# Patient Record
Sex: FEMALE | Race: WHITE | Employment: OTHER | URBAN - METROPOLITAN AREA
[De-identification: names, ages, dates, MRNs, and addresses within clinical notes are randomized per-mention and may not be internally consistent; named-entity substitution may affect disease eponyms.]

---

## 2017-01-23 ENCOUNTER — HOSPITAL ENCOUNTER (OUTPATIENT)
Dept: MAMMOGRAPHY | Age: 67
Discharge: HOME | End: 2017-01-23
Attending: SURGERY
Payer: COMMERCIAL

## 2017-01-23 ENCOUNTER — OFFICE VISIT (OUTPATIENT)
Dept: SURGERY | Age: 67
End: 2017-01-23

## 2017-01-23 VITALS
RESPIRATION RATE: 16 BRPM | OXYGEN SATURATION: 98 % | DIASTOLIC BLOOD PRESSURE: 80 MMHG | TEMPERATURE: 98.3 F | BODY MASS INDEX: 33.8 KG/M2 | SYSTOLIC BLOOD PRESSURE: 140 MMHG | HEART RATE: 87 BPM | WEIGHT: 198 LBS | HEIGHT: 64 IN

## 2017-01-23 DIAGNOSIS — N60.02 BILATERAL BREAST CYSTS: ICD-10-CM

## 2017-01-23 DIAGNOSIS — N60.01 BILATERAL BREAST CYSTS: ICD-10-CM

## 2017-01-23 DIAGNOSIS — N60.02 BILATERAL BREAST CYSTS: Primary | ICD-10-CM

## 2017-01-23 DIAGNOSIS — N60.01 BILATERAL BREAST CYSTS: Primary | ICD-10-CM

## 2017-01-23 PROCEDURE — 77066 DX MAMMO INCL CAD BI: CPT

## 2017-01-23 PROCEDURE — 76642 ULTRASOUND BREAST LIMITED: CPT

## 2017-01-23 RX ORDER — SPIRONOLACTONE 25 MG/1
TABLET ORAL
Refills: 2 | COMMUNITY
Start: 2017-01-14 | End: 2021-05-05

## 2017-01-23 RX ORDER — DICLOFENAC SODIUM 75 MG/1
TABLET, DELAYED RELEASE ORAL
Refills: 1 | Status: ON HOLD | COMMUNITY
Start: 2017-01-14 | End: 2017-06-06

## 2017-01-23 NOTE — PROGRESS NOTES
Surgery History and Physical    Subjective:      Paulie Sue is a 61 y.o.  female who presents for her yearly follow up evaluation regarding her history of cystic disease. She had her MMG and US completed on 1/23/17, which revealed benign findings and a cyst in the left breast that measures 2.2 x 1.4 x 2.3 cm. She is concerned about the cyst and was wondering if surgical intervention is necessary. Chief Complaint   Patient presents with    Breast Cyst     left--had mammogram and U/S today       Patient Active Problem List    Diagnosis Date Noted    Fibrocystic breast 10/29/2014    Hypertension     Lump or mass in breast 10/28/1988     Past Medical History   Diagnosis Date    Fibrocystic breast 10/29/2014    Hypertension     Lump or mass in breast 1988     bilateral breast cysts--seen by NH & AM & GP      Past Surgical History   Procedure Laterality Date    Pr breast surgery procedure unlisted  4/20/00     Angie Speaker: excision rt br cystic material    Pr breast surgery procedure unlisted  2/22/07     Angie Speaker: rt br biopsy w/ wire loc    Hx orthopaedic  1997     neck - C 5,6 fused    Hx gyn  1999     hysterectomy    Hx breast biopsy Right      2000 & 2007, benign    Chantal us bx breast rt vac asst Right 2005     benign      Social History   Substance Use Topics    Smoking status: Never Smoker    Smokeless tobacco: Not on file    Alcohol use Yes      Comment: occasional wine      History reviewed. No pertinent family history. Prior to Admission medications    Medication Sig Start Date End Date Taking? Authorizing Provider   diclofenac EC (VOLTAREN) 75 mg EC tablet TK 1 T PO BID PRF PAIN 1/14/17  Yes Historical Provider   spironolactone (ALDACTONE) 25 mg tablet TK 1 T PO QAM 1/14/17  Yes Historical Provider   estradiol (ESTRACE) 0.5 mg tablet Take  by mouth daily.    Yes Historical Provider   losartan-hydrochlorothiazide [facility]) 100-12.5 mg per tablet  10/3/11  Yes Historical Provider FLUTICASONE PROPIONATE (FLOVENT IN) Take  by inhalation. Yes Historical Provider   albuterol, refill, 90 mcg/Actuation Aero Take  by inhalation. Yes Historical Provider   fluticasone (FLONASE) 50 mcg/Actuation nasal spray by Nasal route nightly. Administer to  nostril. Yes Historical Provider   KLOR-CON 8 mEq tablet  9/28/10   Historical Provider   SINGULAIR 10 mg tablet  10/4/10   Historical Provider   benzonatate (TESSALON) 100 mg capsule  10/7/10   Historical Provider   conjugated estrogens (PREMARIN) 0.3 mg tablet Take  by mouth. Historical Provider     No Known Allergies      Review of Systems:    A comprehensive review of systems was negative except for that written in the History of Present Illness. Objective:     Visit Vitals    /80 (BP 1 Location: Left arm, BP Patient Position: Sitting)    Pulse 87    Temp 98.3 °F (36.8 °C) (Oral)    Resp 16    Ht 5' 4\" (1.626 m)    Wt 198 lb (89.8 kg)    SpO2 98%    BMI 33.99 kg/m2       Physical Exam:  General appearance: alert, cooperative, no distress, appears stated age  Head: Normocephalic, without obvious abnormality, atraumatic  Breasts: both breast normal to inspection, right breast normal to palpation, in the left breasts there is two cyst: one at 2 o clock position that is 3 cm and one at 3 o clock position laterally that's approximately 1 cm in size, no adenopathy bilaterally  Neurologic: Grossly normal      Assessment:       ICD-10-CM ICD-9-CM    1. Bilateral breast cysts N60.01 610.0     N60.02         Plan:     Follow up in 1 year with MMG. Written by mahendra Middleton, as dictated by Isamar Crowe MD.    Total face to face time with patient: 11 minutes. Greater than 50% of the time was spent in counseling. Signed By: Isamar Crowe MD    January 23, 2017

## 2017-01-23 NOTE — PROGRESS NOTES
1. Have you been to the ER, urgent care clinic since your last visit? Hospitalized since your last visit? No    2. Have you seen or consulted any other health care providers outside of the [address] [place] [address] since your last visit? Include any pap smears or colon screening.  No     Patient had mammogram and U/S of left breast  For cyst today

## 2017-01-23 NOTE — MR AVS SNAPSHOT
Visit Information Date & Time Provider Department Dept. Phone Encounter #  
 1/23/2017  1:20 PM Jacob Vela, [address] [facility] [address] [phone] [id number] Upcoming Health Maintenance Date Due Hepatitis C Screening 1950 DTaP/Tdap/Td series (1 - Tdap) 12/22/1973 PAP AKA CERVICAL CYTOLOGY 12/22/1973 FOBT Q 1 YEAR AGE 50-75 12/22/2002 ZOSTER VACCINE AGE 60> 12/22/2012 INFLUENZA AGE 9 TO ADULT 8/1/2016 BREAST CANCER SCRN MAMMOGRAM 11/2/2017 Allergies as of 1/23/2017  Review Complete On: 1/23/2017 By: Jacob Vela MD  
 No Known Allergies Current Immunizations  Never Reviewed No immunizations on file. Not reviewed this visit You Were Diagnosed With   
  
 Codes Comments Bilateral breast cysts    -  Primary ICD-10-CM: N60.01, N60.02 
ICD-9-CM: 610.0 Vitals BP Pulse Temp Resp Height(growth percentile) Weight(growth percentile) 140/80 (BP 1 Location: Left arm, BP Patient Position: Sitting) 87 98.3 °F (36.8 °C) (Oral) 16 5' 4\" (1.626 m) 198 lb (89.8 kg) SpO2 BMI Smoking Status 98% 33.99 kg/m2 Never Smoker BMI and BSA Data Body Mass Index Body Surface Area  
 33.99 kg/m 2 2.01 m 2 Preferred Pharmacy Pharmacy Name Phone [facility] [address] [place] [address] [phone] Your Updated Medication List  
  
   
This list is accurate as of: 1/23/17  2:01 PM.  Always use your most recent med list.  
  
  
  
  
 albuterol (refill) 90 mcg/actuation Aero Take  by inhalation. benzonatate 100 mg capsule Commonly known as:  TESSALON  
  
 diclofenac EC 75 mg EC tablet Commonly known as:  VOLTAREN  
TK 1 T PO BID PRF PAIN  
  
 estradiol 0.5 mg tablet Commonly known as:  ESTRACE Take  by mouth daily. * FLOVENT IN Take  by inhalation. * FLONASE 50 mcg/actuation nasal spray Generic drug:  fluticasone by Nasal route nightly. Administer to {[facility] RX NASAL each/right/left nostril:91654} nostril. KLOR-CON 8 8 mEq tablet Generic drug:  potassium chloride SR  
  
 losartan-hydroCHLOROthiazide 100-12.5 mg per tablet Commonly known as:  HYZAAR  
  
 PREMARIN 0.3 mg tablet Generic drug:  conjugated estrogens Take  by mouth. SINGULAIR 10 mg tablet Generic drug:  montelukast  
  
 spironolactone 25 mg tablet Commonly known as:  ALDACTONE  
TK 1 T PO QAM  
  
 * Notice: This list has 2 medication(s) that are the same as other medications prescribed for you. Read the directions carefully, and ask your doctor or other care provider to review them with you. Introducing [facility] HE[facility]! Dear Sonia Delarosa: 
Thank you for requesting a [employer] account. Our records indicate that you already have an active [employer] account. You can access your account anytime at [email or web address] Did you know that you can access your hospital and ER discharge instructions at any time in [employer]? You can also review all of your test results from your hospital stay or ER visit. Additional Information If you have questions, please visit the Frequently Asked Questions section of the [employer] website at [email or web address] Remember, [employer] is NOT to be used for urgent needs. For medical emergencies, dial 911. Now available from your iPhone and Android! Please provide this summary of care documentation to your next provider. Your primary care clinician is listed as Shelbie Rankin. If you have any questions after today's visit, please call [phone].

## 2017-05-26 ENCOUNTER — HOSPITAL ENCOUNTER (OUTPATIENT)
Dept: GENERAL RADIOLOGY | Age: 67
Discharge: HOME | End: 2017-05-26
Attending: ORTHOPAEDIC SURGERY
Payer: COMMERCIAL

## 2017-05-26 ENCOUNTER — HOSPITAL ENCOUNTER (OUTPATIENT)
Dept: PREADMISSION TESTING | Age: 67
Discharge: HOME | End: 2017-05-26
Payer: COMMERCIAL

## 2017-05-26 VITALS
RESPIRATION RATE: 17 BRPM | OXYGEN SATURATION: 96 % | BODY MASS INDEX: 34.97 KG/M2 | HEART RATE: 85 BPM | TEMPERATURE: 97.2 F | DIASTOLIC BLOOD PRESSURE: 77 MMHG | WEIGHT: 204.81 LBS | HEIGHT: 64 IN | SYSTOLIC BLOOD PRESSURE: 146 MMHG

## 2017-05-26 LAB
25(OH)D3 SERPL-MCNC: 30.9 NG/ML (ref 30–100)
ABO + RH BLD: NORMAL
ALBUMIN SERPL BCP-MCNC: 3.6 G/DL (ref 3.5–5)
ALBUMIN/GLOB SERPL: 1.1 {RATIO} (ref 1.1–2.2)
ALP SERPL-CCNC: 68 U/L (ref 45–117)
ALT SERPL-CCNC: 20 U/L (ref 12–78)
ANION GAP BLD CALC-SCNC: 9 MMOL/L (ref 5–15)
APPEARANCE UR: CLEAR
APTT PPP: 27.9 SEC (ref 22.1–32.5)
AST SERPL W P-5'-P-CCNC: 13 U/L (ref 15–37)
ATRIAL RATE: 75 BPM
BACTERIA URNS QL MICRO: NEGATIVE /HPF
BASOPHILS # BLD AUTO: 0 K/UL (ref 0–0.1)
BASOPHILS # BLD: 1 % (ref 0–1)
BILIRUB SERPL-MCNC: 0.4 MG/DL (ref 0.2–1)
BILIRUB UR QL: NEGATIVE
BLOOD GROUP ANTIBODIES SERPL: NORMAL
BUN SERPL-MCNC: 26 MG/DL (ref 6–20)
BUN/CREAT SERPL: 25 (ref 12–20)
CALCIUM SERPL-MCNC: 8.8 MG/DL (ref 8.5–10.1)
CALCULATED P AXIS, ECG09: 43 DEGREES
CALCULATED R AXIS, ECG10: 0 DEGREES
CALCULATED T AXIS, ECG11: 6 DEGREES
CHLORIDE SERPL-SCNC: 105 MMOL/L (ref 97–108)
CO2 SERPL-SCNC: 27 MMOL/L (ref 21–32)
COLOR UR: ABNORMAL
CREAT SERPL-MCNC: 1.03 MG/DL (ref 0.55–1.02)
DIAGNOSIS, 93000: NORMAL
EOSINOPHIL # BLD: 0.1 K/UL (ref 0–0.4)
EOSINOPHIL NFR BLD: 1 % (ref 0–7)
EPITH CASTS URNS QL MICRO: ABNORMAL /LPF
ERYTHROCYTE [DISTWIDTH] IN BLOOD BY AUTOMATED COUNT: 13 % (ref 11.5–14.5)
EST. AVERAGE GLUCOSE BLD GHB EST-MCNC: 111 MG/DL
GLOBULIN SER CALC-MCNC: 3.4 G/DL (ref 2–4)
GLUCOSE SERPL-MCNC: 79 MG/DL (ref 65–100)
GLUCOSE UR STRIP.AUTO-MCNC: NEGATIVE MG/DL
HBA1C MFR BLD: 5.5 % (ref 4.2–6.3)
HCT VFR BLD AUTO: 40.3 % (ref 35–47)
HGB BLD-MCNC: 13.2 G/DL (ref 11.5–16)
HGB UR QL STRIP: ABNORMAL
HYALINE CASTS URNS QL MICRO: ABNORMAL /LPF (ref 0–5)
INR PPP: 1 (ref 0.9–1.1)
KETONES UR QL STRIP.AUTO: NEGATIVE MG/DL
LEUKOCYTE ESTERASE UR QL STRIP.AUTO: NEGATIVE
LYMPHOCYTES # BLD AUTO: 28 % (ref 12–49)
LYMPHOCYTES # BLD: 2 K/UL (ref 0.8–3.5)
MCH RBC QN AUTO: 28.9 PG (ref 26–34)
MCHC RBC AUTO-ENTMCNC: 32.8 G/DL (ref 30–36.5)
MCV RBC AUTO: 88.4 FL (ref 80–99)
MONOCYTES # BLD: 0.8 K/UL (ref 0–1)
MONOCYTES NFR BLD AUTO: 11 % (ref 5–13)
NEUTS SEG # BLD: 4.1 K/UL (ref 1.8–8)
NEUTS SEG NFR BLD AUTO: 59 % (ref 32–75)
NITRITE UR QL STRIP.AUTO: NEGATIVE
P-R INTERVAL, ECG05: 150 MS
PH UR STRIP: 7 [PH] (ref 5–8)
PLATELET # BLD AUTO: 334 K/UL (ref 150–400)
POTASSIUM SERPL-SCNC: 4.4 MMOL/L (ref 3.5–5.1)
PROT SERPL-MCNC: 7 G/DL (ref 6.4–8.2)
PROT UR STRIP-MCNC: NEGATIVE MG/DL
PROTHROMBIN TIME: 9.9 SEC (ref 9–11.1)
Q-T INTERVAL, ECG07: 382 MS
QRS DURATION, ECG06: 80 MS
QTC CALCULATION (BEZET), ECG08: 426 MS
RBC # BLD AUTO: 4.56 M/UL (ref 3.8–5.2)
RBC #/AREA URNS HPF: ABNORMAL /HPF (ref 0–5)
SODIUM SERPL-SCNC: 141 MMOL/L (ref 136–145)
SP GR UR REFRACTOMETRY: 1.02 (ref 1–1.03)
SPECIMEN EXP DATE BLD: NORMAL
THERAPEUTIC RANGE,PTTT: NORMAL SECS (ref 58–77)
UA: UC IF INDICATED,UAUC: ABNORMAL
UROBILINOGEN UR QL STRIP.AUTO: 0.2 EU/DL (ref 0.2–1)
VENTRICULAR RATE, ECG03: 75 BPM
WBC # BLD AUTO: 6.9 K/UL (ref 3.6–11)
WBC URNS QL MICRO: ABNORMAL /HPF (ref 0–4)

## 2017-05-26 PROCEDURE — 85610 PROTHROMBIN TIME: CPT | Performed by: ORTHOPAEDIC SURGERY

## 2017-05-26 PROCEDURE — 82306 VITAMIN D 25 HYDROXY: CPT | Performed by: ORTHOPAEDIC SURGERY

## 2017-05-26 PROCEDURE — 85730 THROMBOPLASTIN TIME PARTIAL: CPT | Performed by: ORTHOPAEDIC SURGERY

## 2017-05-26 PROCEDURE — 86900 BLOOD TYPING SEROLOGIC ABO: CPT | Performed by: ORTHOPAEDIC SURGERY

## 2017-05-26 PROCEDURE — 85025 COMPLETE CBC W/AUTO DIFF WBC: CPT | Performed by: ORTHOPAEDIC SURGERY

## 2017-05-26 PROCEDURE — 80053 COMPREHEN METABOLIC PANEL: CPT | Performed by: ORTHOPAEDIC SURGERY

## 2017-05-26 PROCEDURE — 83036 HEMOGLOBIN GLYCOSYLATED A1C: CPT | Performed by: ORTHOPAEDIC SURGERY

## 2017-05-26 PROCEDURE — 71020 XR CHEST PA LAT: CPT

## 2017-05-26 PROCEDURE — 36415 COLL VENOUS BLD VENIPUNCTURE: CPT | Performed by: ORTHOPAEDIC SURGERY

## 2017-05-26 PROCEDURE — 81001 URINALYSIS AUTO W/SCOPE: CPT | Performed by: ORTHOPAEDIC SURGERY

## 2017-05-26 PROCEDURE — 93005 ELECTROCARDIOGRAM TRACING: CPT

## 2017-05-26 RX ORDER — GLUCOSAMINE SULFATE 1500 MG
1000 POWDER IN PACKET (EA) ORAL EVERY MORNING
COMMUNITY

## 2017-05-26 RX ORDER — ALBUTEROL SULFATE 90 UG/1
2 AEROSOL, METERED RESPIRATORY (INHALATION)
COMMUNITY
End: 2021-05-10

## 2017-05-26 RX ORDER — FLUTICASONE PROPIONATE 110 UG/1
2 AEROSOL, METERED RESPIRATORY (INHALATION)
COMMUNITY
End: 2021-05-05

## 2017-05-26 RX ORDER — CETIRIZINE HCL 10 MG
10 TABLET ORAL
COMMUNITY
End: 2021-05-05

## 2017-05-26 NOTE — PERIOP NOTES
[facility]  PREOPERATIVE INSTRUCTIONS    Surgery Date:   Tuesday, 6/6/17. Surgery arrival time given by surgeon: NO   If no,[address] CARL[facility] [address] staff will call you between 2 PM- 7 PM the day before surgery with your arrival time. If your surgery is on a Monday, we will call you the preceding Friday. Please call [phone] after 8 PM if you did not receive your arrival time. Verification phone call on Monday, 6/5/17. 1. Please report at the designated time to the [facility] [address]. Bring your insurance card, photo identification, and any copayment ( if applicable). 2. You must have a responsible adult to drive you home. You need to have a responsible adult to stay with you the first 24 hours after surgery if you are going home the same day of your surgery and you should not drive a car for 24 hours following your surgery. 3. Nothing to eat or drink after midnight the night before surgery. This includes no water, gum, mints, coffee, juice, etc.  Please note special instructions, if applicable, below for medications. 4. MEDICATIONS TO TAKE THE MORNING OF SURGERY WITH A SIP OF WATER: Spironolactone and use nasal spray and inhaler. 5. No alcoholic beverages 24 hours before or after your surgery. 6. If you are being admitted to the hospital,please leave personal belongings/luggage in your car until you have an assigned hospital room number. 7. Stop Aspirin and/or any non-steroidal anti-inflammatory drugs (i.e. Ibuprofen, Naproxen, Advil, Aleve) as directed by your surgeon. You may take Tylenol. Stop herbal supplements 1 week prior to  surgery. 8. If you are currently taking Plavix, Coumadin,or any other blood-thinning/anticoagulant medication contact your surgeon for instructions. 9. Please wear comfortable clothes. Wear your glasses instead of contacts. We ask that all money, jewelry and valuables be left at home. Wear no make up, particularly mascara, the day of surgery.    10.  All body piercings, rings,and jewelry need to be removed and left at home. Please wear your hair loose or down. Please no pony-tails, buns, or any metal hair accessories. If you shower the morning of surgery, please do not apply any lotions, powders, or deodorants afterwards. Do not shave any body area within 24 hours of your surgery. 11. Please follow all instructions to avoid any potential surgical cancellation. 12.  Should your physical condition change, (i.e. fever, cold, flu, etc.) please notify your surgeon as soon as possible. 13. It is important to be on time. If a situation occurs where you may be delayed, please call:  [phone] / [phone] on the day of surgery. 14. The [employer] staff can be reached at 21 [phone]. .  15. Special instructions: Free  parking. Bring completed medication form on day of surgery. The patient was contacted  in person. She  verbalize  understanding of all instructions does not  need reinforcement.

## 2017-05-27 LAB
BACTERIA SPEC CULT: ABNORMAL
BACTERIA SPEC CULT: ABNORMAL
SERVICE CMNT-IMP: ABNORMAL

## 2017-06-05 ENCOUNTER — ANESTHESIA EVENT (OUTPATIENT)
Dept: SURGERY | Age: 67
End: 2017-06-05
Payer: COMMERCIAL

## 2017-06-05 NOTE — PROGRESS NOTES
Voicemail left at [phone] [facility] [address] office requesting the preoperative MRSA treatment plan and preoperative antibiotic order for Vancomycin. Refaxed the MRSA result from 5/26/17 and preoperative MRSA treatment plan request to [address] [place] [address] S office. Rosana Costello phoned from [address] [place] [address] S office and stated that the patient was started on Bactroban nasal ointment 6/2/17 and will send the preoperative antibiotic order later today.

## 2017-06-05 NOTE — PERIOP NOTES
Have not received the MRSA plan from Dr. Serena Schaffer office as of yet. Left a VM for Sussy Naylor requesting the plan be faxed to main preop's fax number.   DOS: 6/6/2017

## 2017-06-06 ENCOUNTER — HOSPITAL ENCOUNTER (OUTPATIENT)
Age: 67
Setting detail: OBSERVATION
LOS: 1 days | Discharge: HOME | End: 2017-06-07
Attending: ORTHOPAEDIC SURGERY | Admitting: ORTHOPAEDIC SURGERY
Payer: COMMERCIAL

## 2017-06-06 ENCOUNTER — ANESTHESIA (OUTPATIENT)
Dept: SURGERY | Age: 67
End: 2017-06-06
Payer: COMMERCIAL

## 2017-06-06 ENCOUNTER — APPOINTMENT (OUTPATIENT)
Dept: GENERAL RADIOLOGY | Age: 67
End: 2017-06-06
Attending: ORTHOPAEDIC SURGERY
Payer: COMMERCIAL

## 2017-06-06 PROBLEM — M48.02 CERVICAL STENOSIS OF SPINE: Status: ACTIVE | Noted: 2017-06-06

## 2017-06-06 PROCEDURE — 74011000272 HC RX REV CODE- 272: Performed by: ORTHOPAEDIC SURGERY

## 2017-06-06 PROCEDURE — 77030029620 HC BIT DRL K2M -B: Performed by: ORTHOPAEDIC SURGERY

## 2017-06-06 PROCEDURE — 77030018846 HC SOL IRR STRL H20 ICUM -A: Performed by: ORTHOPAEDIC SURGERY

## 2017-06-06 PROCEDURE — 77030008467 HC STPLR SKN COVD -B: Performed by: ORTHOPAEDIC SURGERY

## 2017-06-06 PROCEDURE — 77030031139 HC SUT VCRL2 J&J -A: Performed by: ORTHOPAEDIC SURGERY

## 2017-06-06 PROCEDURE — 74011250637 HC RX REV CODE- 250/637: Performed by: PHYSICIAN ASSISTANT

## 2017-06-06 PROCEDURE — 74011250636 HC RX REV CODE- 250/636: Performed by: ANESTHESIOLOGY

## 2017-06-06 PROCEDURE — C1821 INTERSPINOUS IMPLANT: HCPCS | Performed by: ORTHOPAEDIC SURGERY

## 2017-06-06 PROCEDURE — 77030008771 HC TU NG SALEM SUMP -A: Performed by: ANESTHESIOLOGY

## 2017-06-06 PROCEDURE — 77030037302 HC SPCR CERV LORDTC INLC -G: Performed by: ORTHOPAEDIC SURGERY

## 2017-06-06 PROCEDURE — 77030029099 HC BN WAX SSPC -A: Performed by: ORTHOPAEDIC SURGERY

## 2017-06-06 PROCEDURE — 77030032490 HC SLV COMPR SCD KNE COVD -B

## 2017-06-06 PROCEDURE — 76210000016 HC OR PH I REC 1 TO 1.5 HR: Performed by: ORTHOPAEDIC SURGERY

## 2017-06-06 PROCEDURE — 74011250636 HC RX REV CODE- 250/636: Performed by: PHYSICIAN ASSISTANT

## 2017-06-06 PROCEDURE — 74011250636 HC RX REV CODE- 250/636: Performed by: ORTHOPAEDIC SURGERY

## 2017-06-06 PROCEDURE — 77030011640 HC PAD GRND REM COVD -A: Performed by: ORTHOPAEDIC SURGERY

## 2017-06-06 PROCEDURE — 77030013079 HC BLNKT BAIR HGGR 3M -A: Performed by: ANESTHESIOLOGY

## 2017-06-06 PROCEDURE — 77030026438 HC STYL ET INTUB CARD -A: Performed by: ANESTHESIOLOGY

## 2017-06-06 PROCEDURE — 77030034850: Performed by: ORTHOPAEDIC SURGERY

## 2017-06-06 PROCEDURE — 77030003907 HC BIT DRL VRTX MEDT -D: Performed by: ORTHOPAEDIC SURGERY

## 2017-06-06 PROCEDURE — 76060000039 HC ANESTHESIA 4 TO 4.5 HR: Performed by: ORTHOPAEDIC SURGERY

## 2017-06-06 PROCEDURE — 77030011267 HC ELECTRD BLD COVD -A: Performed by: ORTHOPAEDIC SURGERY

## 2017-06-06 PROCEDURE — 74011000250 HC RX REV CODE- 250

## 2017-06-06 PROCEDURE — 77030002933 HC SUT MCRYL J&J -A: Performed by: ORTHOPAEDIC SURGERY

## 2017-06-06 PROCEDURE — 74011250636 HC RX REV CODE- 250/636

## 2017-06-06 PROCEDURE — 76001 XR FLUOROSCOPY OVER 60 MINUTES: CPT

## 2017-06-06 PROCEDURE — 77030020782 HC GWN BAIR PAWS FLX 3M -B

## 2017-06-06 PROCEDURE — 77030018836 HC SOL IRR NACL ICUM -A: Performed by: ORTHOPAEDIC SURGERY

## 2017-06-06 PROCEDURE — C1713 ANCHOR/SCREW BN/BN,TIS/BN: HCPCS | Performed by: ORTHOPAEDIC SURGERY

## 2017-06-06 PROCEDURE — 77030034475 HC MISC IMPL SPN: Performed by: ORTHOPAEDIC SURGERY

## 2017-06-06 PROCEDURE — 77030012935 HC DRSG AQUACEL BMS -B: Performed by: ORTHOPAEDIC SURGERY

## 2017-06-06 PROCEDURE — 77030020061 HC IV BLD WRMR ADMIN SET 3M -B: Performed by: ANESTHESIOLOGY

## 2017-06-06 PROCEDURE — 74011000250 HC RX REV CODE- 250: Performed by: ORTHOPAEDIC SURGERY

## 2017-06-06 PROCEDURE — 77030019908 HC STETH ESOPH SIMS -A: Performed by: ANESTHESIOLOGY

## 2017-06-06 PROCEDURE — 76010000175 HC OR TIME 4 TO 4.5 HR INTENSV-TIER 1: Performed by: ORTHOPAEDIC SURGERY

## 2017-06-06 PROCEDURE — 99218 HC RM OBSERVATION: CPT

## 2017-06-06 PROCEDURE — 77030037731 HC PTTY BN HEMOSRB ABRY -H: Performed by: ORTHOPAEDIC SURGERY

## 2017-06-06 PROCEDURE — 77030003666 HC NDL SPINAL BD -A: Performed by: ORTHOPAEDIC SURGERY

## 2017-06-06 PROCEDURE — 77030003445 HC NDL BIOP BN BD -B: Performed by: ORTHOPAEDIC SURGERY

## 2017-06-06 PROCEDURE — 77030008684 HC TU ET CUF COVD -B: Performed by: ANESTHESIOLOGY

## 2017-06-06 PROCEDURE — 77030018673: Performed by: ORTHOPAEDIC SURGERY

## 2017-06-06 DEVICE — SCREW SPNL L16MM DIA4MM CERV SELF STARTING CONSTRN LO PROF: Type: IMPLANTABLE DEVICE | Site: SPINE CERVICAL | Status: FUNCTIONAL

## 2017-06-06 DEVICE — GRAFT BNE 15X13X7MM SPCR CERV ALLGRFT CAPISTRANO: Type: IMPLANTABLE DEVICE | Site: SPINE CERVICAL | Status: FUNCTIONAL

## 2017-06-06 RX ORDER — NALOXONE HYDROCHLORIDE 0.4 MG/ML
0.4 INJECTION, SOLUTION INTRAMUSCULAR; INTRAVENOUS; SUBCUTANEOUS AS NEEDED
Status: DISCONTINUED | OUTPATIENT
Start: 2017-06-06 | End: 2017-06-07 | Stop reason: HOSPADM

## 2017-06-06 RX ORDER — FENTANYL CITRATE 50 UG/ML
INJECTION, SOLUTION INTRAMUSCULAR; INTRAVENOUS AS NEEDED
Status: DISCONTINUED | OUTPATIENT
Start: 2017-06-06 | End: 2017-06-06 | Stop reason: HOSPADM

## 2017-06-06 RX ORDER — SODIUM CHLORIDE 0.9 % (FLUSH) 0.9 %
5-10 SYRINGE (ML) INJECTION AS NEEDED
Status: DISCONTINUED | OUTPATIENT
Start: 2017-06-06 | End: 2017-06-07 | Stop reason: HOSPADM

## 2017-06-06 RX ORDER — PROPOFOL 10 MG/ML
VIAL (ML) INTRAVENOUS
Status: DISCONTINUED | OUTPATIENT
Start: 2017-06-06 | End: 2017-06-06 | Stop reason: HOSPADM

## 2017-06-06 RX ORDER — SODIUM CHLORIDE 0.9 % (FLUSH) 0.9 %
5-10 SYRINGE (ML) INJECTION AS NEEDED
Status: DISCONTINUED | OUTPATIENT
Start: 2017-06-06 | End: 2017-06-06 | Stop reason: HOSPADM

## 2017-06-06 RX ORDER — SODIUM CHLORIDE 9 MG/ML
125 INJECTION, SOLUTION INTRAVENOUS CONTINUOUS
Status: DISCONTINUED | OUTPATIENT
Start: 2017-06-06 | End: 2017-06-07 | Stop reason: HOSPADM

## 2017-06-06 RX ORDER — MUPIROCIN 20 MG/G
OINTMENT TOPICAL 2 TIMES DAILY
Status: DISCONTINUED | OUTPATIENT
Start: 2017-06-06 | End: 2017-06-07 | Stop reason: HOSPADM

## 2017-06-06 RX ORDER — HYDROMORPHONE HYDROCHLORIDE 1 MG/ML
.5-1 INJECTION, SOLUTION INTRAMUSCULAR; INTRAVENOUS; SUBCUTANEOUS
Status: DISCONTINUED | OUTPATIENT
Start: 2017-06-06 | End: 2017-06-06 | Stop reason: HOSPADM

## 2017-06-06 RX ORDER — SODIUM CHLORIDE 0.9 % (FLUSH) 0.9 %
5-10 SYRINGE (ML) INJECTION EVERY 8 HOURS
Status: DISCONTINUED | OUTPATIENT
Start: 2017-06-06 | End: 2017-06-06 | Stop reason: HOSPADM

## 2017-06-06 RX ORDER — LIDOCAINE HYDROCHLORIDE 20 MG/ML
INJECTION, SOLUTION EPIDURAL; INFILTRATION; INTRACAUDAL; PERINEURAL AS NEEDED
Status: DISCONTINUED | OUTPATIENT
Start: 2017-06-06 | End: 2017-06-06 | Stop reason: HOSPADM

## 2017-06-06 RX ORDER — ALBUTEROL SULFATE 90 UG/1
2 AEROSOL, METERED RESPIRATORY (INHALATION)
Status: DISCONTINUED | OUTPATIENT
Start: 2017-06-06 | End: 2017-06-07 | Stop reason: HOSPADM

## 2017-06-06 RX ORDER — HYDROMORPHONE HYDROCHLORIDE 1 MG/ML
0.5 INJECTION, SOLUTION INTRAMUSCULAR; INTRAVENOUS; SUBCUTANEOUS
Status: DISCONTINUED | OUTPATIENT
Start: 2017-06-06 | End: 2017-06-07 | Stop reason: HOSPADM

## 2017-06-06 RX ORDER — SODIUM CHLORIDE 0.9 % (FLUSH) 0.9 %
5-10 SYRINGE (ML) INJECTION EVERY 8 HOURS
Status: DISCONTINUED | OUTPATIENT
Start: 2017-06-07 | End: 2017-06-07 | Stop reason: HOSPADM

## 2017-06-06 RX ORDER — FACIAL-BODY WIPES
10 EACH TOPICAL DAILY PRN
Status: DISCONTINUED | OUTPATIENT
Start: 2017-06-08 | End: 2017-06-07 | Stop reason: HOSPADM

## 2017-06-06 RX ORDER — OXYCODONE HYDROCHLORIDE 5 MG/1
5 TABLET ORAL
Status: DISCONTINUED | OUTPATIENT
Start: 2017-06-06 | End: 2017-06-07 | Stop reason: HOSPADM

## 2017-06-06 RX ORDER — CELECOXIB 400 MG/1
400 CAPSULE ORAL
Status: DISCONTINUED | OUTPATIENT
Start: 2017-06-06 | End: 2017-06-06 | Stop reason: HOSPADM

## 2017-06-06 RX ORDER — VANCOMYCIN/0.9 % SOD CHLORIDE 1.5G/250ML
1500 PLASTIC BAG, INJECTION (ML) INTRAVENOUS EVERY 12 HOURS
Status: COMPLETED | OUTPATIENT
Start: 2017-06-07 | End: 2017-06-07

## 2017-06-06 RX ORDER — VANCOMYCIN/0.9 % SOD CHLORIDE 1.5G/250ML
1500 PLASTIC BAG, INJECTION (ML) INTRAVENOUS EVERY 12 HOURS
Status: COMPLETED | OUTPATIENT
Start: 2017-06-06 | End: 2017-06-07

## 2017-06-06 RX ORDER — GLYCOPYRROLATE 0.2 MG/ML
INJECTION INTRAMUSCULAR; INTRAVENOUS AS NEEDED
Status: DISCONTINUED | OUTPATIENT
Start: 2017-06-06 | End: 2017-06-06 | Stop reason: HOSPADM

## 2017-06-06 RX ORDER — ONDANSETRON 2 MG/ML
4 INJECTION INTRAMUSCULAR; INTRAVENOUS
Status: DISCONTINUED | OUTPATIENT
Start: 2017-06-06 | End: 2017-06-07 | Stop reason: HOSPADM

## 2017-06-06 RX ORDER — FLUTICASONE PROPIONATE 50 MCG
2 SPRAY, SUSPENSION (ML) NASAL
Status: DISCONTINUED | OUTPATIENT
Start: 2017-06-07 | End: 2017-06-07 | Stop reason: HOSPADM

## 2017-06-06 RX ORDER — NEOSTIGMINE METHYLSULFATE 1 MG/ML
INJECTION INTRAVENOUS AS NEEDED
Status: DISCONTINUED | OUTPATIENT
Start: 2017-06-06 | End: 2017-06-06 | Stop reason: HOSPADM

## 2017-06-06 RX ORDER — CETIRIZINE HCL 10 MG
10 TABLET ORAL
Status: DISCONTINUED | OUTPATIENT
Start: 2017-06-06 | End: 2017-06-07 | Stop reason: HOSPADM

## 2017-06-06 RX ORDER — AMOXICILLIN 250 MG
1 CAPSULE ORAL 2 TIMES DAILY
Status: DISCONTINUED | OUTPATIENT
Start: 2017-06-06 | End: 2017-06-07 | Stop reason: HOSPADM

## 2017-06-06 RX ORDER — ROCURONIUM BROMIDE 10 MG/ML
INJECTION, SOLUTION INTRAVENOUS AS NEEDED
Status: DISCONTINUED | OUTPATIENT
Start: 2017-06-06 | End: 2017-06-06 | Stop reason: HOSPADM

## 2017-06-06 RX ORDER — SPIRONOLACTONE 25 MG/1
25 TABLET ORAL DAILY
Status: DISCONTINUED | OUTPATIENT
Start: 2017-06-07 | End: 2017-06-07 | Stop reason: HOSPADM

## 2017-06-06 RX ORDER — VANCOMYCIN/0.9 % SOD CHLORIDE 1.5G/250ML
1500 PLASTIC BAG, INJECTION (ML) INTRAVENOUS
Status: COMPLETED | OUTPATIENT
Start: 2017-06-06 | End: 2017-06-07

## 2017-06-06 RX ORDER — LIDOCAINE HYDROCHLORIDE 10 MG/ML
0.1 INJECTION, SOLUTION EPIDURAL; INFILTRATION; INTRACAUDAL; PERINEURAL AS NEEDED
Status: DISCONTINUED | OUTPATIENT
Start: 2017-06-06 | End: 2017-06-06 | Stop reason: HOSPADM

## 2017-06-06 RX ORDER — SODIUM CHLORIDE, SODIUM LACTATE, POTASSIUM CHLORIDE, CALCIUM CHLORIDE 600; 310; 30; 20 MG/100ML; MG/100ML; MG/100ML; MG/100ML
125 INJECTION, SOLUTION INTRAVENOUS CONTINUOUS
Status: DISCONTINUED | OUTPATIENT
Start: 2017-06-06 | End: 2017-06-06 | Stop reason: HOSPADM

## 2017-06-06 RX ORDER — CEFAZOLIN SODIUM IN 0.9 % NACL 2 G/50 ML
2 INTRAVENOUS SOLUTION, PIGGYBACK (ML) INTRAVENOUS
Status: DISCONTINUED | OUTPATIENT
Start: 2017-06-06 | End: 2017-06-06

## 2017-06-06 RX ORDER — PROPOFOL 10 MG/ML
INJECTION, EMULSION INTRAVENOUS AS NEEDED
Status: DISCONTINUED | OUTPATIENT
Start: 2017-06-06 | End: 2017-06-06 | Stop reason: HOSPADM

## 2017-06-06 RX ORDER — DIAZEPAM 5 MG/1
5 TABLET ORAL
Status: DISCONTINUED | OUTPATIENT
Start: 2017-06-06 | End: 2017-06-07 | Stop reason: HOSPADM

## 2017-06-06 RX ORDER — CYANOCOBALAMIN (VITAMIN B-12) 500 MCG
1000 TABLET ORAL DAILY
Status: DISCONTINUED | OUTPATIENT
Start: 2017-06-07 | End: 2017-06-07 | Stop reason: HOSPADM

## 2017-06-06 RX ORDER — ONDANSETRON 2 MG/ML
INJECTION INTRAMUSCULAR; INTRAVENOUS AS NEEDED
Status: DISCONTINUED | OUTPATIENT
Start: 2017-06-06 | End: 2017-06-06 | Stop reason: HOSPADM

## 2017-06-06 RX ORDER — ONDANSETRON 2 MG/ML
4 INJECTION INTRAMUSCULAR; INTRAVENOUS AS NEEDED
Status: DISCONTINUED | OUTPATIENT
Start: 2017-06-06 | End: 2017-06-06 | Stop reason: HOSPADM

## 2017-06-06 RX ORDER — SUCCINYLCHOLINE CHLORIDE 20 MG/ML
INJECTION INTRAMUSCULAR; INTRAVENOUS AS NEEDED
Status: DISCONTINUED | OUTPATIENT
Start: 2017-06-06 | End: 2017-06-06 | Stop reason: HOSPADM

## 2017-06-06 RX ORDER — MIDAZOLAM HYDROCHLORIDE 1 MG/ML
INJECTION, SOLUTION INTRAMUSCULAR; INTRAVENOUS AS NEEDED
Status: DISCONTINUED | OUTPATIENT
Start: 2017-06-06 | End: 2017-06-06 | Stop reason: HOSPADM

## 2017-06-06 RX ORDER — POLYETHYLENE GLYCOL 3350 17 G/17G
17 POWDER, FOR SOLUTION ORAL DAILY
Status: DISCONTINUED | OUTPATIENT
Start: 2017-06-07 | End: 2017-06-07 | Stop reason: HOSPADM

## 2017-06-06 RX ORDER — DEXAMETHASONE SODIUM PHOSPHATE 4 MG/ML
INJECTION, SOLUTION INTRA-ARTICULAR; INTRALESIONAL; INTRAMUSCULAR; INTRAVENOUS; SOFT TISSUE AS NEEDED
Status: DISCONTINUED | OUTPATIENT
Start: 2017-06-06 | End: 2017-06-06 | Stop reason: HOSPADM

## 2017-06-06 RX ORDER — OXYCODONE HYDROCHLORIDE 5 MG/1
10 TABLET ORAL
Status: DISCONTINUED | OUTPATIENT
Start: 2017-06-06 | End: 2017-06-07 | Stop reason: HOSPADM

## 2017-06-06 RX ORDER — DIPHENHYDRAMINE HYDROCHLORIDE 50 MG/ML
12.5 INJECTION, SOLUTION INTRAMUSCULAR; INTRAVENOUS
Status: DISCONTINUED | OUTPATIENT
Start: 2017-06-06 | End: 2017-06-07 | Stop reason: HOSPADM

## 2017-06-06 RX ADMIN — VANCOMYCIN HYDROCHLORIDE 1500 MG: 10 INJECTION, POWDER, LYOPHILIZED, FOR SOLUTION INTRAVENOUS at 07:15

## 2017-06-06 RX ADMIN — SODIUM CHLORIDE 125 ML/HR: 900 INJECTION, SOLUTION INTRAVENOUS at 22:38

## 2017-06-06 RX ADMIN — GLYCOPYRROLATE 0.4 MG: 0.2 INJECTION INTRAMUSCULAR; INTRAVENOUS at 11:42

## 2017-06-06 RX ADMIN — ROCURONIUM BROMIDE 5 MG: 10 INJECTION, SOLUTION INTRAVENOUS at 07:55

## 2017-06-06 RX ADMIN — OXYCODONE HYDROCHLORIDE 5 MG: 5 TABLET ORAL at 16:42

## 2017-06-06 RX ADMIN — Medication 50 MCG/KG/MIN: at 08:07

## 2017-06-06 RX ADMIN — VANCOMYCIN HYDROCHLORIDE 1500 MG: 10 INJECTION, POWDER, LYOPHILIZED, FOR SOLUTION INTRAVENOUS at 16:31

## 2017-06-06 RX ADMIN — SODIUM CHLORIDE, POTASSIUM CHLORIDE, SODIUM LACTATE AND CALCIUM CHLORIDE: 600; 310; 30; 20 INJECTION, SOLUTION INTRAVENOUS at 10:45

## 2017-06-06 RX ADMIN — PROPOFOL 140 MG: 10 INJECTION, EMULSION INTRAVENOUS at 07:55

## 2017-06-06 RX ADMIN — SODIUM CHLORIDE, POTASSIUM CHLORIDE, SODIUM LACTATE AND CALCIUM CHLORIDE: 600; 310; 30; 20 INJECTION, SOLUTION INTRAVENOUS at 08:39

## 2017-06-06 RX ADMIN — LIDOCAINE HYDROCHLORIDE 60 MG: 20 INJECTION, SOLUTION EPIDURAL; INFILTRATION; INTRACAUDAL; PERINEURAL at 07:55

## 2017-06-06 RX ADMIN — ONDANSETRON 4 MG: 2 INJECTION INTRAMUSCULAR; INTRAVENOUS at 11:32

## 2017-06-06 RX ADMIN — CHLORASEPTIC 1 SPRAY: 1.5 LIQUID ORAL at 21:08

## 2017-06-06 RX ADMIN — HYDROMORPHONE HYDROCHLORIDE 0.5 MG: 1 INJECTION, SOLUTION INTRAMUSCULAR; INTRAVENOUS; SUBCUTANEOUS at 12:27

## 2017-06-06 RX ADMIN — ROCURONIUM BROMIDE 10 MG: 10 INJECTION, SOLUTION INTRAVENOUS at 09:51

## 2017-06-06 RX ADMIN — HYDROMORPHONE HYDROCHLORIDE 0.5 MG: 1 INJECTION, SOLUTION INTRAMUSCULAR; INTRAVENOUS; SUBCUTANEOUS at 12:37

## 2017-06-06 RX ADMIN — NEOSTIGMINE METHYLSULFATE 2 MG: 1 INJECTION INTRAVENOUS at 11:42

## 2017-06-06 RX ADMIN — SODIUM CHLORIDE, POTASSIUM CHLORIDE, SODIUM LACTATE AND CALCIUM CHLORIDE: 600; 310; 30; 20 INJECTION, SOLUTION INTRAVENOUS at 08:34

## 2017-06-06 RX ADMIN — HYDROMORPHONE HYDROCHLORIDE 1 MG: 1 INJECTION, SOLUTION INTRAMUSCULAR; INTRAVENOUS; SUBCUTANEOUS at 12:53

## 2017-06-06 RX ADMIN — OXYCODONE HYDROCHLORIDE 10 MG: 5 TABLET ORAL at 22:43

## 2017-06-06 RX ADMIN — MIDAZOLAM HYDROCHLORIDE 4 MG: 1 INJECTION, SOLUTION INTRAMUSCULAR; INTRAVENOUS at 07:41

## 2017-06-06 RX ADMIN — Medication 75 MCG/KG/MIN: at 08:46

## 2017-06-06 RX ADMIN — FENTANYL CITRATE 100 MCG: 50 INJECTION, SOLUTION INTRAMUSCULAR; INTRAVENOUS at 07:55

## 2017-06-06 RX ADMIN — ROCURONIUM BROMIDE 40 MG: 10 INJECTION, SOLUTION INTRAVENOUS at 08:40

## 2017-06-06 RX ADMIN — SODIUM CHLORIDE 125 ML/HR: 900 INJECTION, SOLUTION INTRAVENOUS at 16:32

## 2017-06-06 RX ADMIN — CHLORASEPTIC 1 SPRAY: 1.5 LIQUID ORAL at 22:37

## 2017-06-06 RX ADMIN — DEXAMETHASONE SODIUM PHOSPHATE 8 MG: 4 INJECTION, SOLUTION INTRA-ARTICULAR; INTRALESIONAL; INTRAMUSCULAR; INTRAVENOUS; SOFT TISSUE at 08:59

## 2017-06-06 RX ADMIN — FENTANYL CITRATE 50 MCG: 50 INJECTION, SOLUTION INTRAMUSCULAR; INTRAVENOUS at 09:24

## 2017-06-06 RX ADMIN — MUPIROCIN: 20 OINTMENT TOPICAL at 18:00

## 2017-06-06 RX ADMIN — DOCUSATE SODIUM -SENNOSIDES 1 TABLET: 50; 8.6 TABLET, COATED ORAL at 18:52

## 2017-06-06 RX ADMIN — SUCCINYLCHOLINE CHLORIDE 100 MG: 20 INJECTION INTRAMUSCULAR; INTRAVENOUS at 07:56

## 2017-06-06 NOTE — IP AVS SNAPSHOT
Current Discharge Medication List  
  
START taking these medications Dose & Instructions Dispensing Information Comments Morning Noon Evening Bedtime  
 mupirocin 2 % ointment Commonly known as:  [facility] Your last dose was: Your next dose is:    
   
   
 by Both Nostrils route two (2) times a day. Quantity:  22 g Refills:  0  
     
   
   
   
  
 oxyCODONE IR 5 mg immediate release tablet Commonly known as:  [facility] Your last dose was: Your next dose is: Take 1-2 tabs PO every 4-6 hours as needed for severe pain. Max 10 tabs in 24 hours. Quantity:  100 Tab Refills:  0  
     
   
   
   
  
 senna-docusate 8.6-50 mg per tablet Commonly known as:  Cindra Bullion Your last dose was: Your next dose is:    
   
   
 Dose:  1 Tab Take 1 Tab by mouth two (2) times a day. Indications: Constipation Quantity:  60 Tab Refills:  1 CONTINUE these medications which have NOT CHANGED Dose & Instructions Dispensing Information Comments Morning Noon Evening Bedtime  
 estradiol 0.5 mg tablet Commonly known as:  ESTRACE Your last dose was: Your next dose is:    
   
   
 Dose:  0.5 mg Take 0.5 mg by mouth every Monday, Wednesday, Friday. TAKES 1/2 tab = 0.25 mg This is the dose. Refills:  0  
     
   
   
   
  
 * FLONASE 50 mcg/actuation nasal spray Generic drug:  fluticasone Your last dose was: Your next dose is:    
   
   
 Dose:  2 Spray 2 Sprays by Nasal route Daily (before breakfast). Refills:  0  
     
   
   
   
  
 * FLOVENT  mcg/actuation inhaler Generic drug:  fluticasone Your last dose was: Your next dose is:    
   
   
 Dose:  2 Puff Take 2 Puffs by inhalation Daily (before breakfast). Refills:  0  
     
   
   
   
  
 losartan-hydroCHLOROthiazide 100-12.5 mg per tablet Commonly known as:  HYZAAR  
   
 Your last dose was: Your next dose is:    
   
   
 Dose:  1 Tab Take 1 Tab by mouth Daily (before breakfast). Refills:  0  
     
   
   
   
  
 mupirocin calcium 2 % nasal ointment Commonly known as:  [facility] Your last dose was: Your next dose is:    
   
   
 by Both Nostrils route two (2) times a day. Refills:  0  
     
   
   
   
  
 spironolactone 25 mg tablet Commonly known as:  ALDACTONE Your last dose was: Your next dose is:    
   
   
 TK 1 T PO QAM  
 Refills:  2 VENTOLIN HFA 90 mcg/actuation inhaler Generic drug:  albuterol Your last dose was: Your next dose is:    
   
   
 Dose:  2 Puff Take 2 Puffs by inhalation every six (6) hours as needed for Wheezing. Refills:  0  
     
   
   
   
  
 VITAMIN D3 1,000 unit tablet Generic drug:  cholecalciferol Your last dose was: Your next dose is:    
   
   
 Dose:  1000 Units Take 1,000 Units by mouth daily. Refills:  0 ZyrTEC 10 mg tablet Generic drug:  cetirizine Your last dose was: Your next dose is:    
   
   
 Dose:  10 mg Take 10 mg by mouth nightly. Refills:  0  
     
   
   
   
  
 * Notice: This list has 2 medication(s) that are the same as other medications prescribed for you. Read the directions carefully, and ask your doctor or other care provider to review them with you. STOP taking these medications   
 diclofenac EC 75 mg EC tablet Commonly known as:  VOLTAREN Where to Get Your Medications Information on where to get these meds will be given to you by the nurse or doctor. ! Ask your nurse or doctor about these medications  
  mupirocin 2 % ointment  
 oxyCODONE IR 5 mg immediate release tablet  
 senna-docusate 8.6-50 mg per tablet

## 2017-06-06 NOTE — BRIEF OP NOTE
BRIEF OPERATIVE NOTE    Date of Procedure: 6/6/2017   Preoperative Diagnosis: CERVICAL SPONDYLOSIS, CERVICAL STENOSIS, CERVICAL RADICULSPOPATHY, HISTORY OF CERVICAL FUSIONS  Postoperative Diagnosis: CERVICAL SPONDYLOSIS, CERVICAL STENOSIS, CERVICAL RADICULSPOPATHY, HISTORY OF CERVICAL FUSIONS    Procedure(s):  C4-C5, C6-C7 ANTERIOR CERVICAL DISECTOMY WITH FUSION, C4-C7 ANTERIOR INSTRUMENTATION , ALLOGRAFT, AUTOGRAFT, ILIAC CREST BONE MARROW ASPIRATE  Surgeon(s) and Role:     * Neena Gale MD - Primary         Assistant Staff: SAMUEL Tafoya  assistant       Surgical Staff:  Circ-1: David Shah RN  Circ-Relief: Nilo Santiago RN  Circ-Intern: Itzel Lopez RN  Scrub Tech-1: Vincent Day  Scrub Tech-Relief: Anselmo Bean  Float Staff: Jenae Contreras, FLACA; Ana Hawley RN  Event Time In   Incision Start 0988   Incision Close      Anesthesia: General   Estimated Blood Loss: minimal  Specimens: none  Findings: cervical DDD and stenosis, C6-7 and C5-6, kyphosis   Complications: none  Implants:   Implant Name Type Inv.  Item Serial No.  Lot No. LRB No. Used Action   SETTABLE,RESORBABLE HEMOSTATIC BONE PUTTY    [id number] N/A 1 Implanted   SPACER CERV LORDTC [id number] -- [place] - [id number]  SPACER CERV LORDTC [id number] -- [place] [id number] [employer] [id number] N/A 1 Implanted   cerivical spacer lordotic    [id number] [employer] [id number] N/A 1 Implanted   Pyranese Plate   NA [employer] INC NA N/A 1 Implanted   SCR SPNE CERV STATIC 4X16MM -- PYRENEES - SNA   SCR SPNE CERV STATIC 4X16MM -- PYRENEES NA [employer]M [employer] NA N/A 8 Implanted

## 2017-06-06 NOTE — OP NOTES
Toni Reid [facility] [address]   [address] [facility], [address]   OP NOTE       Name:  Alex Da Silva   MR#:  [id number]   :  1950   Account #:  [de-identified]    Surgery Date:  2017   Date of Adm:  2017       PREOPERATIVE DIAGNOSES   1. Cervical stenosis. 2. Left cervical radiculopathy. 3. Right cervical radiculopathy. 4. Cervical kyphosis. 5. Cervical disk degeneration. 6. History of previous anterior cervical fusion at C5-6.   7. Cervical stenosis, C4-5 and C6-7. POSTOPERATIVE DIAGNOSES   1. Cervical stenosis. 2. Left cervical radiculopathy. 3. Right cervical radiculopathy. 4. Cervical kyphosis. 5. Cervical disk degeneration. 6. History of previous anterior cervical fusion at C5-6.   7. Cervical stenosis, C4-5 and C6-7. PROCEDURES PERFORMED   1. Anterior cervical diskectomy with decompression of spinal cord and   bilateral exiting nerve roots at C6-C7.   2. C4-C5 anterior cervical diskectomy with decompression of spinal   cord and bilateral exiting nerve roots. 3. Anterior cervical interbody fusion, C4-C5. 4. Anterior cervical interbody fusion, C6-C7.   5. Insertion of lordotic bone cage; C-spine [place] size 6 mm height   15 mm width, 13 mm depth at C4-C5. 6. Insertion of machine bone cage; C-spine, [place] size 7 mm   lordotic 15 mm width, 13 mm depth at C6-C7.   7. Iliac crest bone marrow aspirate from left iliac crest through separate   incision. 8. Morselized allograft montages C4-5 and C6-C7 around the grafts. 9. Anterior cervical plating C4-C7 utilizing K2M [place] plate, 51 mm   plate with 16 mm screws placed at, C4, C5, C6, and C7 all individually   locked. SURGEON: Lizzy Quesada MD    ASSISTANT: SAMUEL Jensen    ESTIMATED BLOOD LOSS: minimal    SPECIMENS REMOVED: none    ANESTHESIA:  General    COMPLICATIONS: None. DRAINS: One small Hemovac.     INDICATIONS FOR SURGERY: The patient is a very pleasant 61  year-old female with refractory neck pain radiating left arm pain,   numbness and tingling in both hands and forearms as well as neck   pain. She had a previous C5-C6 anterior cervical diskectomy and   fusion 21 years ago by Dr. Aracelis Machuca. She had developed recurrent pain in   the neck with radiation severely down the left arm with weakness at   C7, apparently in the left arm. She underwent physical therapy with   minimal improvement as well as medications. She underwent an   epidural steroid injection and had some improvement in her radiating   pain, but not the neck pain or numbness and tingling in the ring and   small finger. This persisted despite conservative management   with plain x-rays that demonstrated a healed C5-C6 uninstrumented   fusion, kyphosis and severe disk degeneration at C6-C7 and C5-C6. MRI demonstrated mostly foraminal stenosis at both C4-C5 and C6-C7   with severe disk degeneration at both levels as well as the kyphosis. We discussed risks and benefits of anterior cervical diskectomy and   fusion. I had her evaluated by Dr. Josey Morales of ENT. They did direct   laryngoscopy and there was no vocal cord paresis. She had had a   previous right side approach. We had discussed utilizing a left sided   approach for revision surgery and this seemed to be okay after the   ENT evaluation. We discussed risks of surgery including bleeding,   infection, spinal fluid leaks, neurological injury, pseudoarthrosis,   hardware failure, adjacent segment disease above and below levels of   surgery, chronic difficulty swallowing or voice changes as well as other   unforeseen medical and anesthetic complications including but not   limited to DVT, PE, MI, respiratory failure and stroke. After   understanding risks and benefits she wished to proceed. The patient   was also noted to be MRSA colonized preoperatively AND underwent   Bactroban nasal ointment b.i.d. This was begun 5 days head.      DESCRIPTION OF PROCEDURE: The patient was identified in the   preoperative. H and P and consent form were updated. Contact   precautions utilized. We changed the antibiotic to  vancomycin given   the MRSA colonization. The anterior cervical spine and left iliac crest   were marked as sites of surgery. Once again reviewed the risks and   benefits. She had numbness and tingling both in the ring and small   finger bilaterally as well as neck pain, positive Spurling maneuver on   the left as well as left triceps and wrist flexor weakness preop. The   patient was then taken to the operating room. SCDs were applied. Once in the operating room, she assisted herself on the operating   room table. SCDs were continued. She underwent general anesthetic   by the anesthesia team. The head was supported on a donut pillow. A   Herrera catheter was inserted under sterile conditions. SSEP, free   running EMG and motor-evoked neurological monitoring electrodes   were applied and baselines obtained. A shoulder roll was applied. The   shoulders were gently tucked in a cephalocaudal direction with copious   padding of the cubital and carpal tunnels, iliac crest, knees and ankles. The patient was then sterilely prepped and draped in the usual   standard fashion. She was administered vancomycin. A time-out was   performed confirming levels of surgery and surgical procedure and   confirmed by Anesthesia staff and myself. I turned attention to ASIS. I made one small stab incision into the left ilium 2 cm back from the   anterior superior iliac spine. A Jamshidi needle was introduced into the   left ilium and 5 mL of bone marrow aspirate was removed. FloSeal was   applied for hemostasis and Dermabond was utilized for skin closure. At this point, attention was turned to cervical spine, we then brought in fluoroscopy identified level C5-C6, C4-  C5 and C6-C7 on the skin.  A single transverse incision appeared to be able to obtain   access to the 3 levels. I then utilized a transverse incision on the left   side of the neck at the level of C5-C6. Careful sharp dissection was   taken down through subcutaneous tissue. The platysma was identified   and divided in line with skin incision. I then made platysmal flaps with   blunt dissection. We then proceeded with standard anterior Leonetta Dub approach to the left side of the neck staying medial to the   carotid sheath, lateral to the tracheoesophageal complex. Blunt   dissection was utilized. The prevertebral fascia was palpated. The   carotid tubercle was palpated. There was tremendous spondylosis   that was felt to be at the level of C6-C7. I placed at this point, a curette   at the level of C6-C7, confirming at this level of C6-C7. I then   proceeded with further exposure out to the uncus utilizing bipolar   cautery, a [place] 4 elevator and hand-held retractors. I then explored   the previous fusion mass at C5-C6. She had a previous Clower-  type construct here. This appeared to be well fused anteriorly. At C4-5   again, kyphosis disk degeneration and spondylosis was seen. The uncovertebral joints were exposed with hand-held retractors and   bipolar cautery. I then placed [place] pins; 1 at C4, 1 at the fusion mass at C5 C6 and   then C7. I placed a covering over the C4 marker. We then placed self-  retaining retractors at C6-C7. A [place] distractor was then applied. The disk was only about 3 mm and severely degenerative. We brought   in the microscope at this point and then proceeded with removal of the   remainder of the disk and cartilage from the endplate's. This was   severe spondylotic. There was bilateral foraminal stenosis due to   uncovertebral joint hypertrophy and disk space height loss. Posterior   osteophytes as well were seen at C6. I utilized the bur to thin the   uncus posteriorly bilaterally and posterior osteophytes and then 1 and   2 mm Kerrisons respectively.  I then proceeded with 1 and 2 mm   Kerrisons for foraminotomies until they were widely patent. The central   canal was well decompressed after posterior osteophyte resection and   debulking of the PLL. A disk space sizer was then utilized here. A 7-  mm had the best fit and fill. I then made 1 small central hole into the   endplates of C6 and C7 and utilized the 7-mm lordotic bone cage, soaked in iliac crest bone marrow aspirate and   tamped 2 mm to anterior vertebral body surface. This had excellent fit   and fill with restoration of disk height and foraminal height. I then   removed the [place] pins at C7. FloSeal was applied for hemostasis. I   let down the retractors for 5 minutes, replaced them at C4-5. At the C4-  5 level, the [place] distractor was then reapplied and then the disk was   removed as well as cartilage from the endplates with pituitary and   curettes. At this level there was minimal central stenosis, but with the kyphosis   here, there was a spondylotic balling off the back ridge at C4   that seemed to impinge centrally. This was removed with a bur then 1   and 2 mm Kerrisons. She had right greater than left foraminal stenosis   at this level and a bur was utilized to thin the uncus and then   foraminotomies with 1 and 2 mm Kerrisons bilaterally. After   decompression was thorough, we then proceeded with disk space   sizers here. A 6 mm had best fit and fill and was able to correct the   kyphosis to about neutral. A 6 mm graft also soaked in iliac crest bone marrow aspirate was tamped into place here at   C4-5. At this point, I then removed all the [place] pins. We removed   the donut pillow from behind the head. This helped improve overall   alignment. I then tried to have further rigidity anteriorly, utilized   montage bone cement over the bleeding surfaces on the right and left-  hand sides of the anterior aspects of the graft.      After good hemostasis at both levels with montage, we then utilized a lordotic cervical plate spanning from C4 down to C7. I placed C5 and   C6 screws first and then C4 and C7 screws and tightened them in   seesaw fashion to help improve overall lordosis. The screws were then   torqued and counter-torqued within 's specifications. Final   AP and lateral fluoroscopic radiographs demonstrated safe position of   all hardware and improved alignment from preop. Then final motor and   sensory neurological monitoring was normal and at baseline. I then   proceeded gently massaging the tracheoesophageal complex back   towards midline after removal of retractors. Excellent hemostasis had   been obtained. I placed a small Hemovac drain deep to the platysma. The platysma was reapproximated with 3-0 Vicryl suture in an   interrupted fashion. Subcuticular closure with 4-0 Strata followed by   Steri-Strips and hard collar. Patient tolerated the procedure well and ASPEN collar placed and was taken to recovery in stable condition. SAMUEL Bhakta was present and   scrubbed for the entire procedure as my surgical assistant.         Jose Baez MD      [facility] / Matthew Garcia   D:  06/06/2017   11:47   T:  06/06/2017   13:05   Job #:  [id number]

## 2017-06-06 NOTE — PERIOP NOTES
TRANSFER - OUT REPORT:    Verbal report given to Susana Pat RN on Hernesto Marsh  being transferred to [phone] for routine post - op       Report consisted of patients Situation, Background, Assessment and   Recommendations(SBAR). Information from the following report(s) SBAR and Intake/Output was reviewed with the receiving nurse. Lines:   Peripheral IV 06/06/17 Left Hand (Active)   Site Assessment Clean, dry, & intact 6/6/2017  1:22 PM   Phlebitis Assessment 0 6/6/2017  1:22 PM   Infiltration Assessment 0 6/6/2017  1:22 PM   Dressing Status Clean, dry, & intact 6/6/2017  1:22 PM   Dressing Type Transparent 6/6/2017  1:22 PM   Hub Color/Line Status Pink 6/6/2017  1:22 PM       Peripheral IV Right Hand (Active)   Site Assessment Clean, dry, & intact 6/6/2017  1:22 PM   Phlebitis Assessment 0 6/6/2017  1:22 PM   Infiltration Assessment 0 6/6/2017  1:22 PM   Dressing Status Clean, dry, & intact 6/6/2017  1:22 PM   Dressing Type Transparent 6/6/2017  1:22 PM   Hub Color/Line Status Green 6/6/2017  1:22 PM        Opportunity for questions and clarification was provided.       Patient transported with:   Registered Nurse

## 2017-06-06 NOTE — PERIOP NOTES
5661 - Patient family updated on surgical progress and patient well being. 1118 - Patient family updated on surgical process and patient well being.

## 2017-06-06 NOTE — ANESTHESIA PREPROCEDURE EVALUATION
Anesthetic History   No history of anesthetic complications       Comments: Dentist told patient never to take any anesthesia with a vasoconstricter     Review of Systems / Medical History  Patient summary reviewed, nursing notes reviewed and pertinent labs reviewed    Pulmonary            Asthma        Neuro/Psych   Within defined limits           Cardiovascular    Hypertension              Exercise tolerance: >4 METS  Comments: Not on beta blocker   GI/Hepatic/Renal         Renal disease: stones  PUD     Endo/Other  Within defined limits           Other Findings            Physical Exam    Airway  Mallampati: I  TM Distance: < 4 cm  Neck ROM: normal range of motion   Mouth opening: Normal     Cardiovascular  Regular rate and rhythm,  S1 and S2 normal,  no murmur, click, rub, or gallop  Rhythm: regular  Rate: normal         Dental  No notable dental hx       Pulmonary  Breath sounds clear to auscultation               Abdominal  GI exam deferred       Other Findings            Anesthetic Plan    ASA: 2  Anesthesia type: general          Induction: Intravenous  Anesthetic plan and risks discussed with: Patient

## 2017-06-06 NOTE — PROGRESS NOTES
Patient seen in recovery room after extubation  Somnolent but responding to questioning and commands  Denied arm pain, some numbness in ring small finger bilaterally (no change from preop)  Dressing c/d  hemovac in place and functioning  Demonstrates equal upper extremity and lower movement bilaterally    Stable postop  -discussed surgery with patient's daughter and SO, answered questions  -d/c etienne  -NIKHIL's  -periop vanc and bactroban nasal ointment due to MRSA colonization  -ambulate  -hemovac overnight  -advance diet  -periop decadron  -plan d/c home tomorrow

## 2017-06-06 NOTE — ANESTHESIA POSTPROCEDURE EVALUATION
Post-Anesthesia Evaluation and Assessment    Patient: Theresa Estimable MRN: [id number]  SSN: [id number]    YOB: 1950  Age: 61 y.o. Sex: female       Cardiovascular Function/Vital Signs  Visit Vitals    /86    Pulse (!) 54    Temp 36.1 °C (97 °F)    Resp 14    Wt 91.9 kg (202 lb 9.6 oz)    SpO2 100%    BMI 34.78 kg/m2       Patient is status post general anesthesia for Procedure(s):  C4-C5, C6-C7 ANTERIOR CERVICAL DISECTOMY WITH FUSION, C4-C7 ANTERIOR INSTRUMENTATION PEAK CAGE, ALLOGRAFT, AUTOGRAFT, ILIAC CREST BONE MARROW ASPIRATE. Nausea/Vomiting: None    Postoperative hydration reviewed and adequate. Pain:  Pain Scale 1: Numeric (0 - 10) (06/06/17 1313)  Pain Intensity 1: 4 (06/06/17 1313)   Managed    Neurological Status:   Neuro (WDL): Exceptions to WDL (06/06/17 1241)  Neuro  LUE Motor Response: Moderate (fingers4, 5 numb) (06/06/17 1209)  LLE Motor Response: Purposeful (06/06/17 1209)  RUE Motor Response: Moderate (fingers4,5 numb) (06/06/17 1209)  RLE Motor Response: Purposeful (06/06/17 1209)   At baseline    Mental Status and Level of Consciousness: Arousable    Pulmonary Status:   O2 Device: Nasal cannula (06/06/17 1209)   Adequate oxygenation and airway patent    Complications related to anesthesia: None    Post-anesthesia assessment completed.  No concerns    Signed By: Casandra Garcia MD     June 6, 2017

## 2017-06-06 NOTE — IP AVS SNAPSHOT
[place]ino Dubose 
 
 
 [address] [place] [address] 
[phone] Patient: Sarkis Cisneros MRN: [id number] AE You are allergic to the following Allergen Reactions Adhesive Contact Dermatitis Recent Documentation Weight BMI OB Status Smoking Status 91.9 kg 34.78 kg/m2 Hysterectomy Never Smoker Emergency Contacts Name Discharge Info Relation Home Work Mobile [address] CAREGIVER [3] Daughter [21] [phone] About your hospitalization You were admitted on:  2017 You last received care in the:  [facility] 4M POST SURG ORT 2 You were discharged on:  2017 Unit phone number:  [phone] Why you were hospitalized Your primary diagnosis was:  Not on File Your diagnoses also included:  Cervical Stenosis Of Spine Providers Seen During Your Hospitalizations Provider Role Specialty Primary office phone Jodi Anna MD Attending Provider Orthopedic Surgery [phone] Your Primary Care Physician (PCP) Primary Care Physician Office Phone Office Fax [address] [facility] [phone] Follow-up Information Follow up With Details Comments Contact Info Rosemary Jenkins [address], [address] 
[phone] Current Discharge Medication List  
  
START taking these medications Dose & Instructions Dispensing Information Comments Morning Noon Evening Bedtime  
 mupirocin 2 % ointment Commonly known as:  [facility] Your last dose was: Your next dose is:    
   
   
 by Both Nostrils route two (2) times a day. Quantity:  22 g Refills:  0  
     
   
   
   
  
 oxyCODONE IR 5 mg immediate release tablet Commonly known as:  Sheba Benavides Your last dose was: Your next dose is: Take 1-2 tabs PO every 4-6 hours as needed for severe pain. Max 10 tabs in 24 hours. Quantity:  100 Tab Refills:  0  
     
   
   
   
  
 senna-docusate 8.6-50 mg per tablet Commonly known as:  Brittney Nolan Your last dose was: Your next dose is:    
   
   
 Dose:  1 Tab Take 1 Tab by mouth two (2) times a day. Indications: Constipation Quantity:  60 Tab Refills:  1 CONTINUE these medications which have NOT CHANGED Dose & Instructions Dispensing Information Comments Morning Noon Evening Bedtime  
 estradiol 0.5 mg tablet Commonly known as:  ESTRACE Your last dose was: Your next dose is:    
   
   
 Dose:  0.5 mg Take 0.5 mg by mouth every Monday, Wednesday, Friday. TAKES 1/2 tab = 0.25 mg This is the dose. Refills:  0  
     
   
   
   
  
 * FLONASE 50 mcg/actuation nasal spray Generic drug:  fluticasone Your last dose was: Your next dose is:    
   
   
 Dose:  2 Spray 2 Sprays by Nasal route Daily (before breakfast). Refills:  0  
     
   
   
   
  
 * FLOVENT  mcg/actuation inhaler Generic drug:  fluticasone Your last dose was: Your next dose is:    
   
   
 Dose:  2 Puff Take 2 Puffs by inhalation Daily (before breakfast). Refills:  0  
     
   
   
   
  
 losartan-hydroCHLOROthiazide 100-12.5 mg per tablet Commonly known as:  HYZAAR Your last dose was: Your next dose is:    
   
   
 Dose:  1 Tab Take 1 Tab by mouth Daily (before breakfast). Refills:  0  
     
   
   
   
  
 mupirocin calcium 2 % nasal ointment Commonly known as:  [facility] Your last dose was: Your next dose is:    
   
   
 by Both Nostrils route two (2) times a day. Refills:  0  
     
   
   
   
  
 spironolactone 25 mg tablet Commonly known as:  ALDACTONE Your last dose was: Your next dose is:    
   
   
 TK 1 T PO QAM  
 Refills:  2 VENTOLIN HFA 90 mcg/actuation inhaler Generic drug:  albuterol Your last dose was: Your next dose is:    
   
   
 Dose:  2 Puff Take 2 Puffs by inhalation every six (6) hours as needed for Wheezing. Refills:  0  
     
   
   
   
  
 VITAMIN D3 1,000 unit tablet Generic drug:  cholecalciferol Your last dose was: Your next dose is:    
   
   
 Dose:  1000 Units Take 1,000 Units by mouth daily. Refills:  0 ZyrTEC 10 mg tablet Generic drug:  cetirizine Your last dose was: Your next dose is:    
   
   
 Dose:  10 mg Take 10 mg by mouth nightly. Refills:  0  
     
   
   
   
  
 * Notice: This list has 2 medication(s) that are the same as other medications prescribed for you. Read the directions carefully, and ask your doctor or other care provider to review them with you. STOP taking these medications   
 diclofenac EC 75 mg EC tablet Commonly known as:  VOLTAREN Where to Get Your Medications Information on where to get these meds will be given to you by the nurse or doctor. ! Ask your nurse or doctor about these medications  
  mupirocin 2 % ointment  
 oxyCODONE IR 5 mg immediate release tablet  
 senna-docusate 8.6-50 mg per tablet Discharge Instructions Jodi Anna M.D. [address] [facility] Office Phone: [phone] Neck Surgery Discharge Instructions Activities ? You are going home a well person, be as active as possible. Your only exercise should be walking. Start with short frequent walks and increase your walking distance each day. Start with walking twice a day for 5 minutes and increase your distance each day 2-3 minutes until you reach 25 minutes twice a day. Limit the amount of time you sit to 20-30 minute intervals. Sitting for prolonged periods of time will be uncomfortable for you following your surgery. ? Do not lift anything over five pounds, and do not do any bending or straining. ? Avoid reaching overhead for 2-3 weeks ? Do not do any neck exercises until you have been instructed by your doctor. ? When you are in the bed, you may lay on your back or on either side. Do not lay on your stomach. ? Continue using your incentive spirometer regularly for deep breathing exercises ? You may resume sexual relations 2 weeks after your surgery Diet ? You may resume your normal diet. If your throat is sore, you may want to eat soft foods for a few days. Be sure to drink plenty of fluids, it is important to keep yourself hydrated. If you begin having trouble swallowing, call the office immediately. ? Avoid alcoholic beverages and ABSOLUTELY NO tobacco products. Tobacco products will interfere with your healing. If you continue to use tobacco, you may end up needing another surgery in the future. Medications ? Do not take anti-inflammatory medications or aspirin unless instructed by your physician. ? Take your pain medication as directed. ? Do NOT take additional Tylenol if your prescribed pain medication has acetaminophen in it (Endocet/Percocet, Lortab, Norco). ? It is important to have regular bowel movements. Pain medications may cause constipation. Stool softeners, prune juice, and increasing your water and fiber intake may help in preventing constipation. ? Do NOT take laxatives if at all possible except in severe situations. It can results in a vicious cycle of constipation and diarrhea. ? Do not be alarmed if you still have some of the same symptoms you had prior to surgery. The nerves often require time to heal after the pressure has been relieved. You may experience pain in your shoulders or between your shoulder blades, which is common after this surgery. The level of pain you experience should improve as your body heals. Driving ? You may not drive or return to work until instructed by your physician. However, you may ride in the car for doctors appointments ONLY. Neck collar ? You are required to wear your cervical collar at all times. You may remove the collar long enough to change the pads when needed and to change your dressing each day. ? Do not bend or twist your neck when your collar is removed. It is best to have someone assist you when changing the pads and dressing to prevent you from bending your neck. Showering ? You may shower in approximately 4 days after your surgery if your incision is not draining. ? Leave the dressing on during your shower but avoid getting the dressing wet. ? Do not use tub baths, swimming pools or Jacuzzis. ? Neck collars may need to be worn even while in the shower. If your collar is removed for showering, be sure not to turn your neck while the collar is off. Also, make sure you put dry pads on your collar after your shower. Caring for your incision ? Leave the dressing on x 7 days after surgery. At that point, you may remove the dressing and keep the incision open to air. ? You will probably have steri-strips on your incision (small, white pieces of tape). Do not pull the steri-strips; they will fall off on their own after several days. If you have sutures or staples, they will be removed when you see your physician. ? Do not rub or apply any lotions or ointments to your incision site. Follow Up 
? Once you are home, call your physicians office to schedule an appointment for your two-week postoperative visit. Notify your physician if you develop any of the following conditions: 
? Fever above 101 degrees for 24 hours. ? Nausea or vomiting. ? Severe headache. 
? Inability to urinate. ? Loss of bowel or bladder control (sudden onset of incontinence). ? Changes in sensation in your extremities (numbness, tingling, loss of color). ? Severe pain or pain not relieved by medications. ? Redness, swelling, or drainage from your incision. ? Persistent pain in the chest.  
? Pain in the calf of either leg. 
? Increased weakness (if this is greater than before your surgery). Discharge Orders None [employer] Announcement We are excited to announce that we are making your provider's discharge notes available to you in [employer]. You will see these notes when they are completed and signed by the physician that discharged you from your recent hospital stay. If you have any questions or concerns about any information you see in [employer], please call the Health Information Department where you were seen or reach out to your Primary Care Provider for more information about your plan of care. Introducing [facility]! Dear Leyla Quiles: 
Thank you for requesting a [employer] account. Our records indicate that you already have an active [employer] account. You can access your account anytime at [email or web address] Did you know that you can access your hospital and ER discharge instructions at any time in [employer]? You can also review all of your test results from your hospital stay or ER visit. Additional Information If you have questions, please visit the Frequently Asked Questions section of the [employer] website at [email or web address] Remember, [employer] is NOT to be used for urgent needs. For medical emergencies, dial 911. Now available from your iPhone and Android! General Information Please provide this summary of care documentation to your next provider. Patient Signature:  ____________________________________________________________ Date:  ____________________________________________________________  
  
Celia Santos Provider Signature:  ____________________________________________________________ Date:  ____________________________________________________________

## 2017-06-07 VITALS
TEMPERATURE: 98.2 F | SYSTOLIC BLOOD PRESSURE: 135 MMHG | RESPIRATION RATE: 18 BRPM | OXYGEN SATURATION: 96 % | HEART RATE: 91 BPM | BODY MASS INDEX: 34.78 KG/M2 | DIASTOLIC BLOOD PRESSURE: 66 MMHG | WEIGHT: 202.6 LBS

## 2017-06-07 PROCEDURE — 74011250637 HC RX REV CODE- 250/637: Performed by: PHYSICIAN ASSISTANT

## 2017-06-07 PROCEDURE — 74011250636 HC RX REV CODE- 250/636: Performed by: PHYSICIAN ASSISTANT

## 2017-06-07 PROCEDURE — 77010033678 HC OXYGEN DAILY

## 2017-06-07 PROCEDURE — 99218 HC RM OBSERVATION: CPT

## 2017-06-07 RX ORDER — OXYCODONE HYDROCHLORIDE 5 MG/1
TABLET ORAL
Qty: 100 TAB | Refills: 0 | Status: SHIPPED | OUTPATIENT
Start: 2017-06-07 | End: 2021-05-05

## 2017-06-07 RX ORDER — MUPIROCIN 20 MG/G
OINTMENT TOPICAL 2 TIMES DAILY
Qty: 22 G | Refills: 0 | Status: SHIPPED | OUTPATIENT
Start: 2017-06-07 | End: 2021-05-05

## 2017-06-07 RX ORDER — AMOXICILLIN 250 MG
1 CAPSULE ORAL 2 TIMES DAILY
Qty: 60 TAB | Refills: 1 | Status: SHIPPED | OUTPATIENT
Start: 2017-06-07 | End: 2021-05-05

## 2017-06-07 RX ADMIN — OXYCODONE HYDROCHLORIDE 10 MG: 5 TABLET ORAL at 10:38

## 2017-06-07 RX ADMIN — OXYCODONE HYDROCHLORIDE 5 MG: 5 TABLET ORAL at 06:53

## 2017-06-07 RX ADMIN — FLUTICASONE FUROATE 1 PUFF: 100 POWDER RESPIRATORY (INHALATION) at 08:55

## 2017-06-07 RX ADMIN — Medication 10 ML: at 05:01

## 2017-06-07 RX ADMIN — DOCUSATE SODIUM -SENNOSIDES 1 TABLET: 50; 8.6 TABLET, COATED ORAL at 08:55

## 2017-06-07 RX ADMIN — VANCOMYCIN HYDROCHLORIDE 1500 MG: 10 INJECTION, POWDER, LYOPHILIZED, FOR SOLUTION INTRAVENOUS at 05:01

## 2017-06-07 RX ADMIN — CHLORASEPTIC 1 SPRAY: 1.5 LIQUID ORAL at 06:15

## 2017-06-07 RX ADMIN — SPIRONOLACTONE 25 MG: 25 TABLET ORAL at 08:55

## 2017-06-07 RX ADMIN — OXYCODONE HYDROCHLORIDE 10 MG: 5 TABLET ORAL at 03:21

## 2017-06-07 RX ADMIN — FLUTICASONE PROPIONATE 2 SPRAY: 50 SPRAY, METERED NASAL at 06:52

## 2017-06-07 RX ADMIN — POLYETHYLENE GLYCOL 3350 17 G: 17 POWDER, FOR SOLUTION ORAL at 08:59

## 2017-06-07 RX ADMIN — MUPIROCIN: 20 OINTMENT TOPICAL at 08:55

## 2017-06-07 RX ADMIN — CHOLECALCIFEROL TAB 10 MCG (400 UNIT) 1000 UNITS: 10 TAB at 08:55

## 2017-06-07 RX ADMIN — LOSARTAN POTASSIUM: 50 TABLET, FILM COATED ORAL at 06:51

## 2017-06-07 NOTE — PROGRESS NOTES
Bedside and Verbal shift change report given to Ceci Tesfaye RN (oncoming nurse) by Oanh Vides RN (offgoing nurse). Report included the following information SBAR, Kardex, Procedure Summary, Intake/Output and MAR.

## 2017-06-07 NOTE — DISCHARGE INSTRUCTIONS
Gerard Hart M.D. [address] [facility]  Office Phone: [phone]    Neck Surgery Discharge Instructions    Activities   You are going home a well person, be as active as possible. Your only exercise should be walking. Start with short frequent walks and increase your walking distance each day. Start with walking twice a day for 5 minutes and increase your distance each day 2-3 minutes until you reach 25 minutes twice a day. Limit the amount of time you sit to 20-30 minute intervals. Sitting for prolonged periods of time will be uncomfortable for you following your surgery.  Do not lift anything over five pounds, and do not do any bending or straining.  Avoid reaching overhead for 2-3 weeks   Do not do any neck exercises until you have been instructed by your doctor.  When you are in the bed, you may lay on your back or on either side. Do not lay on your stomach.  Continue using your incentive spirometer regularly for deep breathing exercises   You may resume sexual relations 2 weeks after your surgery    Diet   You may resume your normal diet. If your throat is sore, you may want to eat soft foods for a few days. Be sure to drink plenty of fluids, it is important to keep yourself hydrated. If you begin having trouble swallowing, call the office immediately.  Avoid alcoholic beverages and ABSOLUTELY NO tobacco products. Tobacco products will interfere with your healing. If you continue to use tobacco, you may end up needing another surgery in the future. Medications   Do not take anti-inflammatory medications or aspirin unless instructed by your physician.  Take your pain medication as directed.  Do NOT take additional Tylenol if your prescribed pain medication has acetaminophen in it (Endocet/Percocet, Lortab, Norco).  It is important to have regular bowel movements. Pain medications may cause constipation.   Stool softeners, prune juice, and increasing your water and fiber intake may help in preventing constipation.  Do NOT take laxatives if at all possible except in severe situations. It can results in a vicious cycle of constipation and diarrhea.  Do not be alarmed if you still have some of the same symptoms you had prior to surgery. The nerves often require time to heal after the pressure has been relieved. You may experience pain in your shoulders or between your shoulder blades, which is common after this surgery. The level of pain you experience should improve as your body heals. Driving   You may not drive or return to work until instructed by your physician. However, you may ride in the car for doctors appointments ONLY. Neck collar   You are required to wear your cervical collar at all times. You may remove the collar long enough to change the pads when needed and to change your dressing each day.  Do not bend or twist your neck when your collar is removed. It is best to have someone assist you when changing the pads and dressing to prevent you from bending your neck. Showering   You may shower in approximately 4 days after your surgery if your incision is not draining.  Leave the dressing on during your shower but avoid getting the dressing wet.  Do not use tub baths, swimming pools or Jacuzzis.  Neck collars may need to be worn even while in the shower. If your collar is removed for showering, be sure not to turn your neck while the collar is off. Also, make sure you put dry pads on your collar after your shower. Caring for your incision   Leave the dressing on x 7 days after surgery. At that point, you may remove the dressing and keep the incision open to air.  You will probably have steri-strips on your incision (small, white pieces of tape). Do not pull the steri-strips; they will fall off on their own after several days.   If you have sutures or staples, they will be removed when you see your physician.  Do not rub or apply any lotions or ointments to your incision site. Follow Up   Once you are home, call your physicians office to schedule an appointment for your two-week postoperative visit. Notify your physician if you develop any of the following conditions:   Fever above 101 degrees for 24 hours.  Nausea or vomiting.  Severe headache.  Inability to urinate.  Loss of bowel or bladder control (sudden onset of incontinence).  Changes in sensation in your extremities (numbness, tingling, loss of color).  Severe pain or pain not relieved by medications.  Redness, swelling, or drainage from your incision.  Persistent pain in the chest.    Pain in the calf of either leg.  Increased weakness (if this is greater than before your surgery).

## 2017-06-07 NOTE — DISCHARGE SUMMARY
[address] [facility])   [address] [place],  [address] [facility]    DISCHARGE SUMMARY     Patient: Clover Nava                             Medical Record Number: [id number]                : 1950  Age: 61 y.o. Admit Date: 2017  Discharge Date: 2017  Admission Diagnosis: CERVICAL SPONDYLOSIS, CERVICAL STENOSIS, CERVICAL RADICULSPOPATHY, HISTORY OF CERVICAL FUSIONS  Cervical stenosis of spine  Cervical stenosis of spine  Discharge Diagnosis: CERVICAL SPONDYLOSIS, CERVICAL STENOSIS, CERVICAL RADICULSPOPATHY, HISTORY OF CERVICAL FUSIONS  Procedures: Procedure(s):  C4-C5, C6-C7 ANTERIOR CERVICAL DISECTOMY WITH FUSION, C4-C7 ANTERIOR INSTRUMENTATION PEAK CAGE, ALLOGRAFT, AUTOGRAFT, ILIAC CREST BONE MARROW ASPIRATE  Surgeon: Dionte Esparza MD  Assist: Kavya Biggs PA-C  Anesthesia: general  Complications: None       Hospital Course:  Clover Nava was admitted the same day of surgery and underwent Procedure(s):  C4-C5, C6-C7 ANTERIOR CERVICAL DISECTOMY WITH FUSION, C4-C7 ANTERIOR INSTRUMENTATION PEAK CAGE, ALLOGRAFT, AUTOGRAFT, ILIAC CREST BONE MARROW ASPIRATE and tolerated the procedure well. She was transferred  to the recovery room in stable condition. After a brief stay the patient was then transferred to the Joint Replacement Unit at [facility].  On postoperative day #1, the dressing was clean and dry, she was neurovascularly intact. The patient was afebrile and vital signs were stable. Calves were soft and non-tender bilaterally. On postoperative day  # 1, the patient was tolerating a regular diet and making satisfactory progress with nursing. Hemoglobin and INR prior to discharge were     Lab Results   Component Value Date/Time    HGB 13.2 2017 08:55 AM    INR 1.0 2017 08:55 AM       Clover Nava made satisfactory progress with physical therapy and was discharged to Home in stable condition on postoperative day 1.   She was provided with routine postoperative instructions and advised to follow up in my office in 2- 3 weeks following discharge from the hospital.  She was prescribed pain medication for post-operative analgesia. Discharge Medications:  Current Discharge Medication List      START taking these medications    Details   mupirocin (BACTROBAN) 2 % ointment by Both Nostrils route two (2) times a day. Qty: 22 g, Refills: 0      oxyCODONE IR (ROXICODONE) 5 mg immediate release tablet Take 1-2 tabs PO every 4-6 hours as needed for severe pain. Max 10 tabs in 24 hours. Qty: 100 Tab, Refills: 0      senna-docusate (PERICOLACE) 8.6-50 mg per tablet Take 1 Tab by mouth two (2) times a day. Indications: Constipation  Qty: 60 Tab, Refills: 1         CONTINUE these medications which have NOT CHANGED    Details   mupirocin calcium (BACTROBAN) 2 % nasal ointment by Both Nostrils route two (2) times a day. albuterol (VENTOLIN HFA) 90 mcg/actuation inhaler Take 2 Puffs by inhalation every six (6) hours as needed for Wheezing. fluticasone (FLOVENT HFA) 110 mcg/actuation inhaler Take 2 Puffs by inhalation Daily (before breakfast). cholecalciferol (VITAMIN D3) 1,000 unit tablet Take 1,000 Units by mouth daily. cetirizine (ZYRTEC) 10 mg tablet Take 10 mg by mouth nightly. spironolactone (ALDACTONE) 25 mg tablet TK 1 T PO QAM  Refills: 2      estradiol (ESTRACE) 0.5 mg tablet Take 0.5 mg by mouth every Monday, Wednesday, Friday. TAKES 1/2 tab = 0.25 mg This is the dose. losartan-hydrochlorothiazide (HYZAAR) 100-12.5 mg per tablet Take 1 Tab by mouth Daily (before breakfast). fluticasone (FLONASE) 50 mcg/Actuation nasal spray 2 Sprays by Nasal route Daily (before breakfast).          STOP taking these medications       diclofenac EC (VOLTAREN) 75 mg EC tablet Comments:   Reason for Stopping:               Signed by: SAMUEL Pacheco  6/7/2017

## 2017-06-07 NOTE — PROGRESS NOTES
Patient handed a copy of instructions and scripts including Oxycodone IR. All medications have been read and explained to her. Patient verbalized understanding of instructions , medications and restrictions.

## 2017-06-07 NOTE — PROGRESS NOTES
Ortho Spine Daily Progress Note    Date of Surgery:  2017      Patient: Hernesto Marsh   YOB: 1950  Age: 61 y.o. SUBJECTIVE:   1 Day Post-Op following C4-C5, C6-C7 ANTERIOR CERVICAL DISECTOMY WITH FUSION, C4-C7 ANTERIOR INSTRUMENTATION PEAK CAGE, ALLOGRAFT, AUTOGRAFT, ILIAC CREST BONE MARROW ASPIRATE    The patient c/o mild sore throat this AM otherwise, doing well. The patient's post operative pain is well- controlled. Pain = 5/10. The patient's pre op arm symptoms are improved. . She denies CP, SOB, N/V/D, dizziness, abdominal pain, HA. -flatus, - BM. OBJECTIVE:     Vital Signs:    Temp (24hrs), Av.6 °F (36.4 °C), Min:96.6 °F (35.9 °C), Max:98.7 °F (37.1 °C)    Patient Vitals for the past 8 hrs:   BP Temp Pulse Resp SpO2   17 0405 143/73 98.2 °F (36.8 °C) 90 18 96 %           Physical Exam:  General: A&Ox3, NAD. Respiratory: Respirations are unlabored. Abdomen: S/NT  Surgical site: C,D and I.  Musculoskeletal: Calves are S/NT, Omar /intrinsics/wrist extension/biceps/triceps intact, Omar dorsi/plantar flexion/EHL 5/5, Omar RP/DP 2+  Neurological:  Omar UE/LE's NVI    Laboratory Values:             No results for input(s): HGB, PLT, INR, CREA, GLU, HGBEXT, PLTEXT in the last 72 hours. No lab exists for component: INREXT  Lab Results   Component Value Date/Time    Sodium 141 2017 08:55 AM    Potassium 4.4 2017 08:55 AM    Chloride 105 2017 08:55 AM    CO2 27 2017 08:55 AM    Glucose 79 2017 08:55 AM    BUN 26 2017 08:55 AM    Creatinine 1.03 2017 08:55 AM    Calcium 8.8 2017 08:55 AM       PLAN:     S/P C4-C5, C6-C7 ANTERIOR CERVICAL DISECTOMY WITH FUSION, C4-C7 ANTERIOR INSTRUMENTATION PEAK CAGE, ALLOGRAFT, AUTOGRAFT, ILIAC CREST BONE MARROW ASPIRATE Mobilize with nursing     Hemodynamics Stable. .     Wound Continue dressing changes PRN. Post Operative Pain Pain Control: Adequate, continue current regimen.      DVT Prophylaxis Continue with SCD'S, Ankle Pump Exercises, Mobilize. Discharge Disposition Discharge plan: Will focus on pain control and mobilizing with nursing. If nursing feels that the patient is safe for discharge then pt may be discharged home today. Follow up in office in 2-3 weeks for post op care.         Signed By: Kati Oliva PA-C  June 7, 2017 7:59 AM

## 2017-06-07 NOTE — PROGRESS NOTES
Met with pt. Confirmed demographics. Pt reports no dc needs. Reports her follow up appt is June 19th. OBS letter provided. Thanks.   Kelton Grady LCSW    Care Management Interventions  Current Support Network: Lives with Spouse, Own Home  Discharge Location  Discharge Placement: Home

## 2018-07-02 ENCOUNTER — TELEPHONE (OUTPATIENT)
Dept: SURGERY | Age: 68
End: 2018-07-02

## 2018-07-02 DIAGNOSIS — N60.09 CYST OF BREAST, UNSPECIFIED LATERALITY: Primary | ICD-10-CM

## 2018-07-02 NOTE — TELEPHONE ENCOUNTER
----- Message from Zack Barillas sent at 6/29/2018 11:20 AM EDT -----  Regarding: Mikhail Barr - Ms. White Byron called to schedule her annual mammogram. Please create new orders for her and I will get her scheduled.     Thank you ,  Demetrice Muñoz

## 2018-07-23 ENCOUNTER — TELEPHONE (OUTPATIENT)
Dept: SURGERY | Age: 68
End: 2018-07-23

## 2018-07-23 NOTE — TELEPHONE ENCOUNTER
I returned Laure's call with [address] and she said the mammogram order Dr. Estela Goodell put in is generating an ABN which means patient will have to pay for mammogram. Nico Valadez would like to put in a new order if ok with Dr. Estela Goodell. Christ Marleys with Dr. Estela Goodell.

## 2018-07-25 ENCOUNTER — OFFICE VISIT (OUTPATIENT)
Dept: SURGERY | Age: 68
End: 2018-07-25

## 2018-07-25 ENCOUNTER — HOSPITAL ENCOUNTER (OUTPATIENT)
Dept: MAMMOGRAPHY | Age: 68
Discharge: HOME | End: 2018-07-25
Attending: SURGERY
Payer: MEDICARE

## 2018-07-25 VITALS
OXYGEN SATURATION: 98 % | HEIGHT: 64 IN | HEART RATE: 82 BPM | TEMPERATURE: 98.2 F | BODY MASS INDEX: 34.49 KG/M2 | RESPIRATION RATE: 16 BRPM | WEIGHT: 202 LBS | SYSTOLIC BLOOD PRESSURE: 120 MMHG | DIASTOLIC BLOOD PRESSURE: 80 MMHG

## 2018-07-25 DIAGNOSIS — Z12.39 SCREENING BREAST EXAMINATION: ICD-10-CM

## 2018-07-25 DIAGNOSIS — N60.01 BILATERAL BREAST CYSTS: Primary | ICD-10-CM

## 2018-07-25 DIAGNOSIS — N60.02 BILATERAL BREAST CYSTS: Primary | ICD-10-CM

## 2018-07-25 PROCEDURE — 77067 SCR MAMMO BI INCL CAD: CPT

## 2018-07-25 NOTE — PROGRESS NOTES
1. Have you been to the ER, urgent care clinic since your last visit? Hospitalized since your last visit? No    2. Have you seen or consulted any other health care providers outside of the [address] [place] [address] since your last visit? Include any pap smears or colon screening.  No

## 2018-07-25 NOTE — MR AVS SNAPSHOT
[address] [facility] [address] [id number] 
[phone] Patient: Onesimo Almanza MRN: [id number] Visit Information Date & Time Provider Department Dept. Phone Encounter #  
 7/25/2018 11:20 AM Narinder Graham [phone] [id number] Upcoming Health Maintenance Date Due Hepatitis C Screening 1950 DTaP/Tdap/Td series (1 - Tdap) 12/22/1973 FOBT Q 1 YEAR AGE 50-75 12/22/2002 ZOSTER VACCINE AGE 60> 10/22/2012 GLAUCOMA SCREENING Q2Y 12/22/2017 Pneumococcal 65+ Low/Medium Risk (1 of 2 - PCV13) 12/22/2017 MEDICARE YEARLY EXAM 7/12/2018 Influenza Age 5 to Adult 8/1/2018 BREAST CANCER SCRN MAMMOGRAM 7/25/2020 Allergies as of 7/25/2018  Review Complete On: 7/25/2018 By: Becki Smalls LPN Severity Noted Reaction Type Reactions Adhesive Medium 05/26/2017   Topical Contact Dermatitis Current Immunizations  Never Reviewed No immunizations on file. Not reviewed this visit You Were Diagnosed With   
  
 Codes Comments Bilateral breast cysts    -  Primary ICD-10-CM: N60.01, N60.02 
ICD-9-CM: 610.0 Vitals BP Pulse Temp Resp Height(growth percentile) Weight(growth percentile) 120/80 (BP 1 Location: Right arm, BP Patient Position: Sitting) 82 98.2 °F (36.8 °C) (Oral) 16 5' 4\" (1.626 m) 202 lb (91.6 kg) SpO2 BMI OB Status Smoking Status 98% 34.67 kg/m2 Hysterectomy Never Smoker BMI and BSA Data Body Mass Index Body Surface Area  
 34.67 kg/m 2 2.03 m 2 Preferred Pharmacy Pharmacy Name Phone [address] [place] [address] [phone] Your Updated Medication List  
  
   
This list is accurate as of 7/25/18 11:59 PM.  Always use your most recent med list.  
  
  
  
  
 estradiol 0.5 mg tablet Commonly known as:  ESTRACE  
 Take 0.5 mg by mouth every Monday, Wednesday, Friday. TAKES 1/2 tab = 0.25 mg This is the dose. * FLONASE 50 mcg/actuation nasal spray Generic drug:  fluticasone 2 Sprays by Nasal route Daily (before breakfast). * FLOVENT  mcg/actuation inhaler Generic drug:  fluticasone Take 2 Puffs by inhalation Daily (before breakfast). losartan-hydroCHLOROthiazide 100-12.5 mg per tablet Commonly known as:  HYZAAR Take 1 Tab by mouth Daily (before breakfast). mupirocin 2 % ointment Commonly known as:  BACTROBAN  
by Both Nostrils route two (2) times a day. mupirocin calcium 2 % nasal ointment Commonly known as:  BACTROBAN  
by Both Nostrils route two (2) times a day. oxyCODONE IR 5 mg immediate release tablet Commonly known as:  [place] Take 1-2 tabs PO every 4-6 hours as needed for severe pain. Max 10 tabs in 24 hours. senna-docusate 8.6-50 mg per tablet Commonly known as:  Lan Jubilee Take 1 Tab by mouth two (2) times a day. Indications: Constipation  
  
 spironolactone 25 mg tablet Commonly known as:  ALDACTONE  
TK 1 T PO QAM  
  
 VENTOLIN HFA 90 mcg/actuation inhaler Generic drug:  albuterol Take 2 Puffs by inhalation every six (6) hours as needed for Wheezing. VITAMIN D3 1,000 unit tablet Generic drug:  cholecalciferol Take 1,000 Units by mouth daily. ZyrTEC 10 mg tablet Generic drug:  cetirizine Take 10 mg by mouth nightly. * Notice: This list has 2 medication(s) that are the same as other medications prescribed for you. Read the directions carefully, and ask your doctor or other care provider to review them with you. Introducing [facility]! Dear Jazmine Hester: 
Thank you for requesting a [employer] account. Our records indicate that you already have an active [employer] links[employer] account. You can access your account anytime at [email or web address] Did you know that you can access your hospital and ER discharge instructions at any time in 19[employer]? You can also review all of your test results from your hospital stay or ER visit. Additional Information If you have questions, please visit the Frequently Asked Questions section of the 19[employer] website at [email or web address] Remember, 19pay is NOT to be used for urgent needs. For medical emergencies, dial 911. Now available from your iPhone and Android! Please provide this summary of care documentation to your next provider. Your primary care clinician is listed as Rosemary Campos. If you have any questions after today's visit, please call [phone].

## 2018-07-30 NOTE — PROGRESS NOTES
Surgery History and Physical    Subjective:      Man Uriostegui is a 74 y.o.  female who presents for her annual exam and mammograms. The mammograms were fine. No symptoms referable to her breasts. .    Patient Active Problem List    Diagnosis Date Noted    Cervical stenosis of spine 06/06/2017    Fibrocystic breast 10/29/2014    Hypertension     Lump or mass in breast 10/28/1988     Past Medical History:   Diagnosis Date    Adverse effect of anesthesia     Dentist told patient never to take any anesthesia with a vasoconstricter    Asthma     Chronic kidney disease 05/26/2017    Noted in [email or web address]; pt denies; Stage 3???    Fibrocystic breast 10/29/2014    Hypertension     Ill-defined condition     Kidney stones     Lump or mass in breast 1988    bilateral breast cysts--seen by NH & AM & GP    PUD (peptic ulcer disease)     Seasonal allergic rhinitis       Past Surgical History:   Procedure Laterality Date    BREAST SURGERY PROCEDURE UNLISTED  4/20/00    Cindy Tucker: excision rt br cystic material    BREAST SURGERY PROCEDURE UNLISTED  2/22/07    Cindy Tucker: rt br biopsy w/ wire loc    HX BREAST BIOPSY Right     2000 & 2007 surgical right breast biopsies; benign    HX GYN  1999    hysterectomy    HX HEENT      wisdom teeth    HX ORTHOPAEDIC  01/1996    neck - C 5,6 fused    HX ORTHOPAEDIC  06/2017    spinal fusion    SUSANNAH US BX BREAST RT VAC ASST Right 2005 2005 US right breast biopsy; benign      Social History   Substance Use Topics    Smoking status: Never Smoker    Smokeless tobacco: Never Used    Alcohol use No      History reviewed. No pertinent family history. Prior to Admission medications    Medication Sig Start Date End Date Taking? Authorizing Provider   albuterol (VENTOLIN HFA) 90 mcg/actuation inhaler Take 2 Puffs by inhalation every six (6) hours as needed for Wheezing.    Yes Historical Provider   fluticasone (FLOVENT HFA) 110 mcg/actuation inhaler Take 2 Puffs by inhalation Daily (before breakfast). Yes Historical Provider   cholecalciferol (VITAMIN D3) 1,000 unit tablet Take 1,000 Units by mouth daily. Yes Historical Provider   spironolactone (ALDACTONE) 25 mg tablet TK 1 T PO QAM 1/14/17  Yes Historical Provider   losartan-hydrochlorothiazide (HYZAAR) 100-12.5 mg per tablet Take 1 Tab by mouth Daily (before breakfast). 10/3/11  Yes Historical Provider   fluticasone (FLONASE) 50 mcg/Actuation nasal spray 2 Sprays by Nasal route Daily (before breakfast). Yes Historical Provider   mupirocin (BACTROBAN) 2 % ointment by Both Nostrils route two (2) times a day. 6/7/17   Marcus Hermosillo PA-C   oxyCODONE IR (ROXICODONE) 5 mg immediate release tablet Take 1-2 tabs PO every 4-6 hours as needed for severe pain. Max 10 tabs in 24 hours. 6/7/17   Marcus Hermosillo PA-C   senna-docusate (PERICOLACE) 8.6-50 mg per tablet Take 1 Tab by mouth two (2) times a day. Indications: Constipation 6/7/17   Marcus Hermosillo PA-C   mupirocin calcium (BACTROBAN) 2 % nasal ointment by Both Nostrils route two (2) times a day. Historical Provider   cetirizine (ZYRTEC) 10 mg tablet Take 10 mg by mouth nightly. Historical Provider   estradiol (ESTRACE) 0.5 mg tablet Take 0.5 mg by mouth every Monday, Wednesday, Friday. TAKES 1/2 tab = 0.25 mg This is the dose. Historical Provider     Allergies   Allergen Reactions    Adhesive Contact Dermatitis         Review of Systems:    A comprehensive review of systems was negative except for that written in the History of Present Illness. Objective:     [facility]@    [id number]    Physical Exam:  GENERAL: alert, cooperative, no distress, appears stated age; BREASTS: both are normal t inspection. There are no masses, nipple retraction or skin dimpling on either side. Both are normal to palpation. No axillary nor supraclavicular adenopathy on either side    Labs: No results found for this or any previous visit (from the past 24 hour(s)). Data Review:  Radiology reviewed. Mammograms were unremarkable.      Assessment:     Fibrocystic breasts    Plan:     Repeat in one year    Signed By: Ivory Zacarias MD     July 30, 2018

## 2020-02-04 ENCOUNTER — HOSPITAL ENCOUNTER (OUTPATIENT)
Dept: MRI IMAGING | Age: 70
Discharge: HOME | End: 2020-02-04
Attending: ORTHOPAEDIC SURGERY
Payer: MEDICARE

## 2020-02-04 DIAGNOSIS — M54.16 LUMBAR RADICULITIS: ICD-10-CM

## 2020-02-04 DIAGNOSIS — M51.36 DEGENERATION OF LUMBAR INTERVERTEBRAL DISC: ICD-10-CM

## 2020-02-04 PROCEDURE — 72148 MRI LUMBAR SPINE W/O DYE: CPT

## 2021-03-04 ENCOUNTER — TRANSCRIBE ORDER (OUTPATIENT)
Dept: SCHEDULING | Age: 71
End: 2021-03-04

## 2021-03-04 DIAGNOSIS — M54.16 LUMBAR RADICULOPATHY: Primary | ICD-10-CM

## 2021-03-17 ENCOUNTER — HOSPITAL ENCOUNTER (OUTPATIENT)
Dept: MRI IMAGING | Age: 71
Discharge: HOME | End: 2021-03-17
Attending: ORTHOPAEDIC SURGERY
Payer: MEDICARE

## 2021-03-17 ENCOUNTER — HOSPITAL ENCOUNTER (OUTPATIENT)
Dept: GENERAL RADIOLOGY | Age: 71
Discharge: HOME | End: 2021-03-17
Attending: ORTHOPAEDIC SURGERY
Payer: MEDICARE

## 2021-03-17 ENCOUNTER — TRANSCRIBE ORDER (OUTPATIENT)
Dept: GENERAL RADIOLOGY | Age: 71
End: 2021-03-17

## 2021-03-17 DIAGNOSIS — R52 PAIN: Primary | ICD-10-CM

## 2021-03-17 DIAGNOSIS — R52 PAIN: ICD-10-CM

## 2021-03-17 DIAGNOSIS — M54.16 LUMBAR RADICULOPATHY: ICD-10-CM

## 2021-03-17 PROCEDURE — 72148 MRI LUMBAR SPINE W/O DYE: CPT

## 2021-03-17 PROCEDURE — 73502 X-RAY EXAM HIP UNI 2-3 VIEWS: CPT

## 2021-03-17 PROCEDURE — 72100 X-RAY EXAM L-S SPINE 2/3 VWS: CPT

## 2021-03-29 ENCOUNTER — TRANSCRIBE ORDER (OUTPATIENT)
Dept: SCHEDULING | Age: 71
End: 2021-03-29

## 2021-03-29 DIAGNOSIS — Z13.820 ENCOUNTER FOR IMAGING TO ASSESS OSTEOPOROSIS: Primary | ICD-10-CM

## 2021-04-02 ENCOUNTER — TRANSCRIBE ORDER (OUTPATIENT)
Dept: SCHEDULING | Age: 71
End: 2021-04-02

## 2021-04-02 DIAGNOSIS — Z78.0 MENOPAUSE: Primary | ICD-10-CM

## 2021-04-12 ENCOUNTER — HOSPITAL ENCOUNTER (OUTPATIENT)
Dept: MAMMOGRAPHY | Age: 71
Discharge: HOME | End: 2021-04-12
Attending: INTERNAL MEDICINE
Payer: MEDICARE

## 2021-04-12 DIAGNOSIS — Z78.0 MENOPAUSE: ICD-10-CM

## 2021-04-12 PROCEDURE — 77080 DXA BONE DENSITY AXIAL: CPT

## 2021-04-19 NOTE — PROGRESS NOTES
Scheduled patient for PAT, class and Covid testing.   Patient denies history of any positive Covid test.

## 2021-05-05 ENCOUNTER — HOSPITAL ENCOUNTER (OUTPATIENT)
Dept: PREADMISSION TESTING | Age: 71
Discharge: HOME | End: 2021-05-05
Payer: MEDICARE

## 2021-05-05 VITALS
SYSTOLIC BLOOD PRESSURE: 143 MMHG | TEMPERATURE: 96.8 F | HEIGHT: 64 IN | HEART RATE: 84 BPM | OXYGEN SATURATION: 97 % | RESPIRATION RATE: 16 BRPM | BODY MASS INDEX: 32.69 KG/M2 | DIASTOLIC BLOOD PRESSURE: 68 MMHG | WEIGHT: 191.5 LBS

## 2021-05-05 LAB
ABO + RH BLD: NORMAL
ALBUMIN SERPL-MCNC: 4.2 G/DL (ref 3.5–5)
ALBUMIN/GLOB SERPL: 1.3 {RATIO} (ref 1.1–2.2)
ALP SERPL-CCNC: 88 U/L (ref 45–117)
ALT SERPL-CCNC: 28 U/L (ref 12–78)
ANION GAP SERPL CALC-SCNC: 5 MMOL/L (ref 5–15)
APPEARANCE UR: CLEAR
APTT PPP: 26.5 SEC (ref 22.1–31)
AST SERPL-CCNC: 18 U/L (ref 15–37)
BACTERIA URNS QL MICRO: NEGATIVE /HPF
BILIRUB SERPL-MCNC: 0.4 MG/DL (ref 0.2–1)
BILIRUB UR QL: NEGATIVE
BLOOD GROUP ANTIBODIES SERPL: NORMAL
BUN SERPL-MCNC: 20 MG/DL (ref 6–20)
BUN/CREAT SERPL: 22 (ref 12–20)
CALCIUM SERPL-MCNC: 9.6 MG/DL (ref 8.5–10.1)
CHLORIDE SERPL-SCNC: 105 MMOL/L (ref 97–108)
CO2 SERPL-SCNC: 29 MMOL/L (ref 21–32)
COLOR UR: ABNORMAL
CREAT SERPL-MCNC: 0.91 MG/DL (ref 0.55–1.02)
EPITH CASTS URNS QL MICRO: ABNORMAL /LPF
ERYTHROCYTE [DISTWIDTH] IN BLOOD BY AUTOMATED COUNT: 12.6 % (ref 11.5–14.5)
EST. AVERAGE GLUCOSE BLD GHB EST-MCNC: 111 MG/DL
GLOBULIN SER CALC-MCNC: 3.2 G/DL (ref 2–4)
GLUCOSE SERPL-MCNC: 110 MG/DL (ref 65–100)
GLUCOSE UR STRIP.AUTO-MCNC: NEGATIVE MG/DL
HBA1C MFR BLD: 5.5 % (ref 4–5.6)
HCT VFR BLD AUTO: 43.3 % (ref 35–47)
HGB BLD-MCNC: 13.8 G/DL (ref 11.5–16)
HGB UR QL STRIP: ABNORMAL
INR PPP: 1 (ref 0.9–1.1)
KETONES UR QL STRIP.AUTO: NEGATIVE MG/DL
LEUKOCYTE ESTERASE UR QL STRIP.AUTO: ABNORMAL
MCH RBC QN AUTO: 28.4 PG (ref 26–34)
MCHC RBC AUTO-ENTMCNC: 31.9 G/DL (ref 30–36.5)
MCV RBC AUTO: 89.1 FL (ref 80–99)
NITRITE UR QL STRIP.AUTO: NEGATIVE
NRBC # BLD: 0 K/UL (ref 0–0.01)
NRBC BLD-RTO: 0 PER 100 WBC
PH UR STRIP: 5.5 [PH] (ref 5–8)
PLATELET # BLD AUTO: 348 K/UL (ref 150–400)
PMV BLD AUTO: 9.4 FL (ref 8.9–12.9)
POTASSIUM SERPL-SCNC: 3.6 MMOL/L (ref 3.5–5.1)
PROT SERPL-MCNC: 7.4 G/DL (ref 6.4–8.2)
PROT UR STRIP-MCNC: NEGATIVE MG/DL
PROTHROMBIN TIME: 10.4 SEC (ref 9–11.1)
RBC # BLD AUTO: 4.86 M/UL (ref 3.8–5.2)
RBC #/AREA URNS HPF: ABNORMAL /HPF (ref 0–5)
SODIUM SERPL-SCNC: 139 MMOL/L (ref 136–145)
SP GR UR REFRACTOMETRY: 1.02 (ref 1–1.03)
SPECIMEN EXP DATE BLD: NORMAL
THERAPEUTIC RANGE,PTTT: NORMAL SECS (ref 58–77)
UA: UC IF INDICATED,UAUC: ABNORMAL
UROBILINOGEN UR QL STRIP.AUTO: 0.2 EU/DL (ref 0.2–1)
WBC # BLD AUTO: 8.6 K/UL (ref 3.6–11)
WBC URNS QL MICRO: ABNORMAL /HPF (ref 0–4)

## 2021-05-05 PROCEDURE — 83036 HEMOGLOBIN GLYCOSYLATED A1C: CPT

## 2021-05-05 PROCEDURE — 93005 ELECTROCARDIOGRAM TRACING: CPT

## 2021-05-05 PROCEDURE — 86900 BLOOD TYPING SEROLOGIC ABO: CPT

## 2021-05-05 PROCEDURE — 80053 COMPREHEN METABOLIC PANEL: CPT

## 2021-05-05 PROCEDURE — 81001 URINALYSIS AUTO W/SCOPE: CPT

## 2021-05-05 PROCEDURE — 36415 COLL VENOUS BLD VENIPUNCTURE: CPT

## 2021-05-05 PROCEDURE — 85730 THROMBOPLASTIN TIME PARTIAL: CPT

## 2021-05-05 PROCEDURE — 85027 COMPLETE CBC AUTOMATED: CPT

## 2021-05-05 PROCEDURE — 85610 PROTHROMBIN TIME: CPT

## 2021-05-05 RX ORDER — DICLOFENAC SODIUM 75 MG/1
75 TABLET, DELAYED RELEASE ORAL 2 TIMES DAILY
COMMUNITY

## 2021-05-05 RX ORDER — LOSARTAN POTASSIUM 100 MG/1
100 TABLET ORAL DAILY
COMMUNITY

## 2021-05-05 RX ORDER — DEXTROMETHORPHAN HYDROBROMIDE, GUAIFENESIN 5; 100 MG/5ML; MG/5ML
650 LIQUID ORAL 2 TIMES DAILY
COMMUNITY
End: 2021-05-12

## 2021-05-05 RX ORDER — HYDROCHLOROTHIAZIDE 25 MG/1
25 TABLET ORAL EVERY MORNING
COMMUNITY

## 2021-05-05 RX ORDER — ROSUVASTATIN CALCIUM 5 MG/1
5 TABLET, COATED ORAL
COMMUNITY

## 2021-05-05 NOTE — FACE TO FACE
The patient and  attended the pre-operative joint replacement class. The content of the class was presented using a power point presentation and visual demonstrations specific for patients undergoing total hip and knee replacement surgery. A Patient Education Book specific to total hip and knee replacement was given to the patient. Incentive spirometer and CHG bath kit were demonstrated in class. Day of surgery routine and expectations, hospital routine and expectations, nutrition, alcohol, nicotine, medications, infection control, pain management, DVT precautions and equipment, ice therapy, home preparation and safety were reviewed in class. Physical therapy present for class and presented information and education about Durable Medical Equipment, exercises, mobility expectations, caregiver education, and home safety. During class, the attendees had opportunities to ask questions of the material presented as well as any concerns about their upcoming surgery.

## 2021-05-05 NOTE — H&P
Preoperative Evaluation                     History and Physical with Surgical Risk Stratification     5/5/2021    CC: Left hip pain    HPI:   Arina Zambrano is a 78 y.o. female referred for pre-operative evaluation by Dr. Kenneth Romero for surgery on 5/11/21. Medina Christine notes she was having back pain has been a patient of Dr. Candy Doimngo. When she continued with pain they did an MRI which showed OA in her hip along with a length discrepicency. She gets pain that will radiate down her left leg. Sleeping has gotten worse as she cannot lay on her side. The pain is constant. She gets no relief from anything. While standing the pain is less but sitting down or getting up the pain will increase. Denies buckling or falls. She was sent to Dr. Kellen Moran with cardiology by her PCP for a baseline workup and abnormal EKG. No further workup needed and she has been cleared for surgery. The patient was evaluated in the surgeon's office and it was determined that the most appropriate plan of care is to proceed with surgical intervention. Patient's PCP Indigo Khalil MD    Review of Systems     Constitutional: Negative for chills and fever  HENT: Negative for congestion and sore throat  Eyes: negative for blurred vision and double vision  Respiratory: Negative for cough, shortness of breath and wheezing  Mouth: Negative for loose, broken or chipped teeth. Cardiovascular: Negative for chest pain and palpitations  Gastrointestinal: Negative for abdominal pain, nausea, diarrhea & constipation  Genitourinary: Negative for dysuria and hematuria  Musculoskeletal: Left Hip pain, Low back pain  Skin: Negative for rash, open wounds. Neurological: Negative for dizziness, tremors and headaches  Psychiatric: The patient is not nervous/anxious.     Inherent Risk of Surgery     Surgical risk:  Intermediate  Intermediate:  Joint Replacement, Spinal surgery, abdominal, thoracic, carotid endarterctomy, prostate, head and neck    Patient Cardiac Risk Assessment     Revised Cardiac Risk Index (RCRI)    Rate if cardiac death, nonfatal MI, nonfatal cardiac arrest by number of risk factor- 0.4%    EDI/AHA 2007 Guidelines:   1) Surgery Emergency, Non-cardiac -> to surgery  2) If not, look at clinical predictors    Intermediate Minor   Abnormal EKG  Blood Thinner: NA    METS      EQUAL TO 4 Care for self Walk indoors around house Walk 2-3 blocks on level ground (2-3 mph) Light work around house (dust, dishes)     Other Risk Factors:   Screening for ETOH use:  Done and low risk  Smoking status:  Never   Marijuana Use:  No    Personal or FH of bleeding problems:  No  Personal or FH of blood clots:  No  Personal or FH of anesthesia problems:   Yes    Pulmonary Risk:  Asthma or COPD:  No  Body mass index is 32.87 kg/m². Known YOLY:  No    Past Medical, Surgical, Social History     Allergies: Allergies   Allergen Reactions    Latex Rash    Adhesive Contact Dermatitis       Medication Documentation Review Audit     Reviewed by Erika Vang RN (Registered Nurse) on 05/05/21 at [phone]    Medication Sig Documenting Provider Last Dose Status Taking?   acetaminophen (Tylenol Arthritis Pain) 650 mg TbER Take 650 mg by mouth two (2) times a day. Provider, Historical  Active Yes   albuterol (VENTOLIN HFA) 90 mcg/actuation inhaler Take 2 Puffs by inhalation every six (6) hours as needed for Wheezing. Provider, Historical  Active Yes   cholecalciferol (Vitamin D3) 25 mcg (1,000 unit) cap Take 1,000 Units by mouth Every morning. Provider, Historical  Active Yes   diclofenac EC (VOLTAREN) 75 mg EC tablet Take 75 mg by mouth two (2) times a day. Provider, Historical  Active Yes   fluticasone (FLONASE) 50 mcg/Actuation nasal spray 2 Sprays by Nasal route two (2) times a day. Provider, Historical  Active Yes   hydroCHLOROthiazide (HYDRODIURIL) 25 mg tablet Take 25 mg by mouth Every morning.  Provider, Historical  Active Yes   losartan (COZAAR) 100 mg tablet Take 100 mg by mouth daily. Provider, Historical  Active Yes   rosuvastatin (CRESTOR) 5 mg tablet Take 5 mg by mouth nightly. Provider, Historical  Active Yes              Past Medical History:   Diagnosis Date    Adverse effect of anesthesia     Dentist told patient never to take any anesthesia with a vasoconstricter    Asthma     COVID-19 vaccine series completed     Pfizer    Fibrocystic breast 10/29/2014    Hypertension     Kidney stones     MRSA nasal colonization 05/05/2021    PUD (peptic ulcer disease)     Seasonal allergic rhinitis      Past Surgical History:   Procedure Laterality Date    HX BREAST BIOPSY Right 2000 & 2007    HX CARPAL TUNNEL RELEASE Bilateral     HX CERVICAL FUSION  2017    HX GYN  1999    hysterectomy    HX HEENT      wisdom teeth    HX ORTHOPAEDIC  01/1996    neck - C 5,6 fused    HX ORTHOPAEDIC  06/2017    spinal fusion    SUSANNAH US BX BREAST RT VAC ASST Right 2005 2005 US right breast biopsy; benign    [place] BREAST SURGERY PROCEDURE UNLISTED  4/20/00    Vernona Revering: excision rt br cystic material    [place] BREAST SURGERY PROCEDURE UNLISTED  2/22/07    Vernona Revering: rt br biopsy w/ wire loc     Social History     Tobacco Use    Smoking status: Never Smoker    Smokeless tobacco: Never Used   Substance Use Topics    Alcohol use: No    Drug use: No     No family history on file.     Objective     Vitals:    05/05/21 1302   BP: (!) 143/68   Pulse: 84   Resp: 16   Temp: 96.8 °F (36 °C)   SpO2: 97%   Weight: 86.9 kg (191 lb 8 oz)   Height: 5' 4\" (1.626 m)       Constitutional:  Appears well,  No Acute Distress, Vitals noted  Psychiatric:   Affect normal, Alert and Oriented to person/place/time    Eyes:   Pupils equally round and reactive, EOMI, conjunctiva clear, eyelids normal  ENT:   External ears and nose normal, teeth normal, gums normal, TMs and Orophyarynx normal  Neck:   General inspection and Thyroid normal.  No abnormal cervical or supraclavicular nodes    Lungs:   Clear to auscultation, good respiratory effort  Heart: Ausculation normal.  Regular rhythm. No cardiac murmurs. No carotid bruits or palpable thrills  Chest wall normal  Musculoskeletal: Gait antalgic  Extremities:   Without edema, good peripheral pulses  Skin:   Warm to palpation, without rashes, bruising, or suspicious lesions     Recent Results (from the past 168 hour(s))   CBC W/O DIFF    Collection Time: 05/05/21  1:33 PM   Result Value Ref Range    WBC 8.6 3.6 - 11.0 K/uL    RBC 4.86 3.80 - 5.20 M/uL    HGB 13.8 11.5 - 16.0 g/dL    HCT 43.3 35.0 - 47.0 %    MCV 89.1 80.0 - 99.0 FL    MCH 28.4 26.0 - 34.0 PG    MCHC 31.9 30.0 - 36.5 g/dL    RDW 12.6 11.5 - 14.5 %    PLATELET 639 235 - 335 K/uL    MPV 9.4 8.9 - 12.9 FL    NRBC 0.0 0  WBC    ABSOLUTE NRBC 0.00 0.00 - 7.14 K/uL   METABOLIC PANEL, COMPREHENSIVE    Collection Time: 05/05/21  1:33 PM   Result Value Ref Range    Sodium 139 136 - 145 mmol/L    Potassium 3.6 3.5 - 5.1 mmol/L    Chloride 105 97 - 108 mmol/L    CO2 29 21 - 32 mmol/L    Anion gap 5 5 - 15 mmol/L    Glucose 110 (H) 65 - 100 mg/dL    BUN 20 6 - 20 MG/DL    Creatinine 0.91 0.55 - 1.02 MG/DL    BUN/Creatinine ratio 22 (H) 12 - 20      GFR est AA >60 >60 ml/min/1.73m2    GFR est non-AA >60 >60 ml/min/1.73m2    Calcium 9.6 8.5 - 10.1 MG/DL    Bilirubin, total 0.4 0.2 - 1.0 MG/DL    ALT (SGPT) 28 12 - 78 U/L    AST (SGOT) 18 15 - 37 U/L    Alk.  phosphatase 88 45 - 117 U/L    Protein, total 7.4 6.4 - 8.2 g/dL    Albumin 4.2 3.5 - 5.0 g/dL    Globulin 3.2 2.0 - 4.0 g/dL    A-G Ratio 1.3 1.1 - 2.2     HEMOGLOBIN A1C WITH EAG    Collection Time: 05/05/21  1:33 PM   Result Value Ref Range    Hemoglobin A1c 5.5 4.0 - 5.6 %    Est. average glucose 111 mg/dL   CULTURE, MRSA    Collection Time: 05/05/21  1:33 PM    Specimen: Nares; Nasal   Result Value Ref Range    Special Requests: NO SPECIAL REQUESTS      Culture result: MRSA PRESENT (A)      Culture result:        Screening of patient nares for MRSA is for surveillance purposes and, if positive, to facilitate isolation considerations in high risk settings. It is not intended for automatic decolonization interventions per se as regimens are not sufficiently effective to warrant routine use.    PROTHROMBIN TIME + INR    Collection Time: 05/05/21  1:33 PM   Result Value Ref Range    INR 1.0 0.9 - 1.1      Prothrombin time 10.4 9.0 - 11.1 sec   PTT    Collection Time: 05/05/21  1:33 PM   Result Value Ref Range    aPTT 26.5 22.1 - 31.0 sec    aPTT, therapeutic range     58.0 - 77.0 SECS   URINALYSIS W/ REFLEX CULTURE    Collection Time: 05/05/21  1:33 PM    Specimen: Urine   Result Value Ref Range    Color YELLOW/STRAW      Appearance CLEAR CLEAR      Specific gravity 1.020 1.003 - 1.030      pH (UA) 5.5 5.0 - 8.0      Protein Negative NEG mg/dL    Glucose Negative NEG mg/dL    Ketone Negative NEG mg/dL    Bilirubin Negative NEG      Blood SMALL (A) NEG      Urobilinogen 0.2 0.2 - 1.0 EU/dL    Nitrites Negative NEG      Leukocyte Esterase TRACE (A) NEG      WBC 5-10 0 - 4 /hpf    RBC 0-5 0 - 5 /hpf    Epithelial cells FEW FEW /lpf    Bacteria Negative NEG /hpf    UA:UC IF INDICATED CULTURE NOT INDICATED BY UA RESULT CNI     TYPE & SCREEN    Collection Time: 05/05/21  1:33 PM   Result Value Ref Range    Crossmatch Expiration 05/14/2021,2359     ABO/Rh(D) B NEGATIVE     Antibody screen NEG    EKG, 12 LEAD, INITIAL    Collection Time: 05/05/21  2:08 PM   Result Value Ref Range    Ventricular Rate 80 BPM    Atrial Rate 80 BPM    P-R Interval 142 ms    QRS Duration 82 ms    Q-T Interval 380 ms    QTC Calculation (Bezet) 438 ms    Calculated P Axis -8 degrees    Calculated R Axis -5 degrees    Calculated T Axis 12 degrees    Diagnosis       Normal sinus rhythm  Minimal voltage criteria for LVH, may be normal variant  Poor R-wave Progression  Abnormal ECG  Confirmed by Patricia Bustillo ([id number]) on 5/6/2021 4:12:06 PM         Assessment and Plan     Assessment/Plan: 1) OA Left Hip  2) Pre-Operative Evaluation    Labs and EKG reviewed. MRSA Positive- see separate note    Plan:  Left Hip Replacement    Preoperative Clearance  Per RCRI, the patient has a 0.4% risk of cardiac death, nonfatal MI, nonfatal cardiac arrest based on no risk factors. Per ACC/AHA guidelines, patient is low risk for a(n) intermediate risk surgery and may proceed to planned surgery with the above noted risk.     Gina Dixon NP

## 2021-05-05 NOTE — PERIOP NOTES
[address] [address] [facility]     PRE-ADMISSION TESTING    [phone]     Surgery Date: Tuesday 5/11/21       Is surgery arrival time given by surgeon? NO    If NO, [facility] staff will call you between 3 and 7pm the day before your surgery with your arrival time. (If your surgery is on a Monday, we will call you the Friday before.)      Call [phone] after 7pm Monday-Friday if you did not receive this call. INSTRUCTIONS BEFORE YOUR SURGERY   When You  Arrive Arrive at the [address] [facility] on the day of your surgery  Have your insurance card, photo ID, and any copayment (if needed)   Food   and   Drink NO food or drink after midnight the night before surgery    This means NO water, gum, mints, coffee, juice, etc.  No alcohol (beer, wine, liquor) 24 hours before and after surgery   Medications to   TAKE   Morning of Surgery MEDICATIONS TO TAKE THE MORNING OF SURGERY WITH A SIP OF WATER:    ALBUTEROL NEEDED   FLONASE AS NEEDED   TYLENOL ARTHRITIS   Medications  To  STOP      7 days before surgery  Non-Steroidal anti-inflammatory Drugs (NSAID's): for example, Ibuprofen (Advil, Motrin), Naproxen (Aleve)   Aspirin, if taking for pain    Herbal supplements, vitamins, and fish oil   Other:  (Pain medications not listed above, including Tylenol may be taken)   Blood  Thinners  If you take  Aspirin, Plavix, Coumadin, or any blood-thinning or anti-blood clot medicine, talk to the doctor who prescribed the medications for pre-operative instructions. Bathing Clothing  Jewelry  Valuables      If you shower the morning of surgery, please do not apply anything to your skin (lotions, powders, deodorant, or makeup, especially mascara)   Follow Chlorhexidine Care Fusion body wash instructions provided to you during PAT appointment. Begin 3 days prior to surgery.    Do not shave or trim anywhere 24 hours before surgery   Wear your hair loose or down; no pony-tails, buns, or metal hair clips   Wear loose, comfortable, clean clothes   Wear glasses instead of contacts  [address], valuables, and jewelry, including body piercings, at home   Going Home - or Spending the Night  SAME-DAY SURGERY: You must have a responsible adult drive you home and stay with you 24 hours after surgery   ADMITS: If your doctor is keeping you in the hospital after surgery, leave personal belongings/luggage in your car until you have a hospital room number. Hospital discharge time is 12 noon  Drivers must be here before 12 noon unless you are told differently   Special Instructions It is now mandated that all surgical patients be tested for COVID-19 prior to surgery. Testing has to be exactly 4 days prior to surgery. Your COVID test date is Friday 5/7/21 between 8:00 am and 11:00 am.       COVID testing will be performed curbs[facility] [address]. There will be signs leading you to the testing site. You will need to bring a photo ID with you to be swabbed. Patients are advised to self-quarantine at home after testing and prior to your surgery date. You will be notified if your results are positive. What to watch for:   Coronavirus (COVID-19) affects different people in different ways   It also appears with a wide range of symptoms from mild to severe   Signs usually appear 2-14 days after exposure     If you develop any of the following, notify your doctor immediately:  o Fever  o Chills, with or without a shiver  o Muscle pain  o Headache  o Sore throat  o Dry cough  o New loss of taste or smell  o Tiredness      If you develop any of the following, call 911:  o Shortness of breath  o Difficulty breathing  o Chest pain  o New confusion  o Blueness of fingers and/or lips     Follow all instructions so your surgery wont be cancelled. Please, be on time.                     If a situation occurs and you are delayed the day of surgery, call [phone] . If your physical condition changes (like a fever, cold, flu, etc.) call your surgeon. Home medication(s) reviewed and verified via   VERBAL   during PAT appointment. The patient was contacted by     IN-PERSON  The patient verbalizes understanding of all instructions and     DOES NOT   need reinforcement.

## 2021-05-05 NOTE — H&P (VIEW-ONLY)
Preoperative Evaluation History and Physical with Surgical Risk Stratification 5/5/2021 CC: Left hip pain HPI:  
Sunshine Romano is a 78 y.o. female referred for pre-operative evaluation by Dr. Sapphire Mcconnell for surgery on 5/11/21. Kelly Williamson notes she was having back pain has been a patient of Dr. Symone Kimball. When she continued with pain they did an MRI which showed OA in her hip along with a length discrepicency. She gets pain that will radiate down her left leg. Sleeping has gotten worse as she cannot lay on her side. The pain is constant. She gets no relief from anything. While standing the pain is less but sitting down or getting up the pain will increase. Denies buckling or falls. She was sent to Dr. Ramya Swift with cardiology by her PCP for a baseline workup and abnormal EKG. No further workup needed and she has been cleared for surgery. The patient was evaluated in the surgeon's office and it was determined that the most appropriate plan of care is to proceed with surgical intervention. Patient's PCP Jermaine Victoria MD 
 
Review of Systems Constitutional: Negative for chills and fever HENT: Negative for congestion and sore throat Eyes: negative for blurred vision and double vision Respiratory: Negative for cough, shortness of breath and wheezing Mouth: Negative for loose, broken or chipped teeth. Cardiovascular: Negative for chest pain and palpitations Gastrointestinal: Negative for abdominal pain, nausea, diarrhea & constipation Genitourinary: Negative for dysuria and hematuria Musculoskeletal: Left Hip pain, Low back pain Skin: Negative for rash, open wounds. Neurological: Negative for dizziness, tremors and headaches Psychiatric: The patient is not nervous/anxious. Inherent Risk of Surgery Surgical risk:  Intermediate Intermediate:  Joint Replacement, Spinal surgery, abdominal, thoracic, carotid endarterctomy, prostate, head and neck Patient Cardiac Risk Assessment Revised Cardiac Risk Index (RCRI) Rate if cardiac death, nonfatal MI, nonfatal cardiac arrest by number of risk factor- 0.4% EDI/AHA 2007 Guidelines:  
1) Surgery Emergency, Non-cardiac -> to surgery 2) If not, look at clinical predictors Intermediate Minor Abnormal EKG Blood Thinner: NA 
 
METS  
 
 EQUAL TO 4 Care for self Walk indoors around house Walk 2-3 blocks on level ground (2-3 mph) Light work around house (dust, dishes) Other Risk Factors:  
Screening for ETOH use:  Done and low risk Smoking status:  Never Marijuana Use:  No 
 
Personal or FH of bleeding problems:  No 
Personal or FH of blood clots:  No 
Personal or FH of anesthesia problems:   Yes Pulmonary Risk: 
Asthma or COPD:  No 
Body mass index is 32.87 kg/m². Known YOLY:  No 
 
Past Medical, Surgical, Social History Allergies: Allergies Allergen Reactions  Latex Rash  Adhesive Contact Dermatitis Medication Documentation Review Audit Reviewed by Salina Rodgers RN (Registered Nurse) on 05/05/21 at 3615 Medication Sig Documenting Provider Last Dose Status Taking?  
acetaminophen (Tylenol Arthritis Pain) 650 mg TbER Take 650 mg by mouth two (2) times a day. Provider, Historical  Active Yes  
albuterol (VENTOLIN HFA) 90 mcg/actuation inhaler Take 2 Puffs by inhalation every six (6) hours as needed for Wheezing. Provider, Historical  Active Yes  
cholecalciferol (Vitamin D3) 25 mcg (1,000 unit) cap Take 1,000 Units by mouth Every morning. Provider, Historical  Active Yes  
diclofenac EC (VOLTAREN) 75 mg EC tablet Take 75 mg by mouth two (2) times a day. Provider, Historical  Active Yes  
fluticasone (FLONASE) 50 mcg/Actuation nasal spray 2 Sprays by Nasal route two (2) times a day. Provider, Historical  Active Yes  
hydroCHLOROthiazide (HYDRODIURIL) 25 mg tablet Take 25 mg by mouth Every morning.  Provider, Historical  Active Yes  
losartan (COZAAR) 100 mg tablet Take 100 mg by mouth daily. Provider, Historical  Active Yes  
rosuvastatin (CRESTOR) 5 mg tablet Take 5 mg by mouth nightly. Provider, Historical  Active Yes Past Medical History:  
Diagnosis Date  Adverse effect of anesthesia Dentist told patient never to take any anesthesia with a vasoconstricter  Asthma  COVID-19 vaccine series completed Zixi  Fibrocystic breast 10/29/2014  Hypertension  Kidney stones  MRSA nasal colonization 05/05/2021  PUD (peptic ulcer disease)  Seasonal allergic rhinitis Past Surgical History:  
Procedure Laterality Date  HX BREAST BIOPSY Right 2000 & 2007  HX CARPAL TUNNEL RELEASE Bilateral   
 HX CERVICAL FUSION  2017 [address]  
 hysterectomy  HX HEENT    
 wisdom teeth  HX ORTHOPAEDIC  01/1996  
 neck - C 5,6 fused  HX ORTHOPAEDIC  06/2017  
 spinal fusion  SUSANNAH US BX BREAST RT VAC ASST Right 2005 2005 US right breast biopsy; benign  AR BREAST SURGERY PROCEDURE UNLISTED  4/20/00 Jorje: excision rt br cystic material  
 AR BREAST SURGERY PROCEDURE UNLISTED  2/22/07 Jorje: rt br biopsy w/ wire loc Social History Tobacco Use  Smoking status: Never Smoker  Smokeless tobacco: Never Used Substance Use Topics  Alcohol use: No  
 Drug use: No  
 
No family history on file. Objective Vitals:  
 05/05/21 1302 BP: (!) 143/68 Pulse: 84 Resp: 16 Temp: 96.8 °F (36 °C) SpO2: 97% Weight: 86.9 kg (191 lb 8 oz) Height: 5' 4\" (1.626 m) Constitutional:  Appears well,  No Acute Distress, Vitals noted Psychiatric:   Affect normal, Alert and Oriented to person/place/time Eyes:   Pupils equally round and reactive, EOMI, conjunctiva clear, eyelids normal 
ENT:   External ears and nose normal, teeth normal, gums normal, TMs and Orophyarynx normal 
Neck:   General inspection and Thyroid normal.  No abnormal cervical or supraclavicular nodes Lungs:   Clear to auscultation, good respiratory effort Heart: Ausculation normal.  Regular rhythm. No cardiac murmurs. No carotid bruits or palpable thrills Chest wall normal 
Musculoskeletal: Gait antalgic Extremities:   Without edema, good peripheral pulses Skin:   Warm to palpation, without rashes, bruising, or suspicious lesions Recent Results (from the past 168 hour(s)) CBC W/O DIFF Collection Time: 05/05/21  1:33 PM  
Result Value Ref Range WBC 8.6 3.6 - 11.0 K/uL  
 RBC 4.86 3.80 - 5.20 M/uL  
 HGB 13.8 11.5 - 16.0 g/dL HCT 43.3 35.0 - 47.0 % MCV 89.1 80.0 - 99.0 FL  
 MCH 28.4 26.0 - 34.0 PG  
 MCHC 31.9 30.0 - 36.5 g/dL  
 RDW 12.6 11.5 - 14.5 % PLATELET 839 606 - 948 K/uL MPV 9.4 8.9 - 12.9 FL  
 NRBC 0.0 0  WBC ABSOLUTE NRBC 0.00 0.00 - 0.01 K/uL METABOLIC PANEL, COMPREHENSIVE Collection Time: 05/05/21  1:33 PM  
Result Value Ref Range Sodium 139 136 - 145 mmol/L Potassium 3.6 3.5 - 5.1 mmol/L Chloride 105 97 - 108 mmol/L  
 CO2 29 21 - 32 mmol/L Anion gap 5 5 - 15 mmol/L Glucose 110 (H) 65 - 100 mg/dL BUN 20 6 - 20 MG/DL Creatinine 0.91 0.55 - 1.02 MG/DL  
 BUN/Creatinine ratio 22 (H) 12 - 20 GFR est AA >60 >60 ml/min/1.73m2 GFR est non-AA >60 >60 ml/min/1.73m2 Calcium 9.6 8.5 - 10.1 MG/DL Bilirubin, total 0.4 0.2 - 1.0 MG/DL  
 ALT (SGPT) 28 12 - 78 U/L  
 AST (SGOT) 18 15 - 37 U/L Alk. phosphatase 88 45 - 117 U/L Protein, total 7.4 6.4 - 8.2 g/dL Albumin 4.2 3.5 - 5.0 g/dL Globulin 3.2 2.0 - 4.0 g/dL A-G Ratio 1.3 1.1 - 2.2 HEMOGLOBIN A1C WITH EAG Collection Time: 05/05/21  1:33 PM  
Result Value Ref Range Hemoglobin A1c 5.5 4.0 - 5.6 % Est. average glucose 111 mg/dL CULTURE, MRSA Collection Time: 05/05/21  1:33 PM  
 Specimen: Nares; Nasal  
Result Value Ref Range Special Requests: NO SPECIAL REQUESTS Culture result: MRSA PRESENT (A)  Culture result:     
  Screening of patient nares for MRSA is for surveillance purposes and, if positive, to facilitate isolation considerations in high risk settings. It is not intended for automatic decolonization interventions per se as regimens are not sufficiently effective to warrant routine use. PROTHROMBIN TIME + INR Collection Time: 05/05/21  1:33 PM  
Result Value Ref Range INR 1.0 0.9 - 1.1 Prothrombin time 10.4 9.0 - 11.1 sec PTT Collection Time: 05/05/21  1:33 PM  
Result Value Ref Range aPTT 26.5 22.1 - 31.0 sec  
 aPTT, therapeutic range     58.0 - 77.0 SECS  
URINALYSIS W/ REFLEX CULTURE Collection Time: 05/05/21  1:33 PM  
 Specimen: Urine Result Value Ref Range Color YELLOW/STRAW Appearance CLEAR CLEAR Specific gravity 1.020 1.003 - 1.030    
 pH (UA) 5.5 5.0 - 8.0 Protein Negative NEG mg/dL Glucose Negative NEG mg/dL Ketone Negative NEG mg/dL Bilirubin Negative NEG Blood SMALL (A) NEG Urobilinogen 0.2 0.2 - 1.0 EU/dL Nitrites Negative NEG Leukocyte Esterase TRACE (A) NEG    
 WBC 5-10 0 - 4 /hpf  
 RBC 0-5 0 - 5 /hpf Epithelial cells FEW FEW /lpf Bacteria Negative NEG /hpf  
 UA:UC IF INDICATED CULTURE NOT INDICATED BY UA RESULT CNI    
TYPE & SCREEN Collection Time: 05/05/21  1:33 PM  
Result Value Ref Range Crossmatch Expiration 05/14/2021,2359 ABO/Rh(D) B NEGATIVE Antibody screen NEG   
EKG, 12 LEAD, INITIAL Collection Time: 05/05/21  2:08 PM  
Result Value Ref Range Ventricular Rate 80 BPM  
 Atrial Rate 80 BPM  
 P-R Interval 142 ms QRS Duration 82 ms Q-T Interval 380 ms QTC Calculation (Bezet) 438 ms Calculated P Axis -8 degrees Calculated R Axis -5 degrees Calculated T Axis 12 degrees Diagnosis Normal sinus rhythm Minimal voltage criteria for LVH, may be normal variant Poor R-wave Progression Abnormal ECG Confirmed by Joaquin Celia ([id number]) on 5/6/2021 4:12:06 PM 
  
 
 
Assessment and Plan Assessment/Plan:  
1) OA Left Hip 2) Pre-Operative Evaluation Labs and EKG reviewed. MRSA Positive- see separate note Plan: 
Left Hip Replacement Preoperative Clearance Per RCRI, the patient has a 0.4% risk of cardiac death, nonfatal MI, nonfatal cardiac arrest based on no risk factors. Per ACC/AHA guidelines, patient is low risk for a(n) intermediate risk surgery and may proceed to planned surgery with the above noted risk.  
 
Paulina Mcgrath NP

## 2021-05-06 LAB
ATRIAL RATE: 80 BPM
BACTERIA SPEC CULT: ABNORMAL
BACTERIA SPEC CULT: ABNORMAL
CALCULATED P AXIS, ECG09: -8 DEGREES
CALCULATED R AXIS, ECG10: -5 DEGREES
CALCULATED T AXIS, ECG11: 12 DEGREES
DIAGNOSIS, 93000: NORMAL
P-R INTERVAL, ECG05: 142 MS
Q-T INTERVAL, ECG07: 380 MS
QRS DURATION, ECG06: 82 MS
QTC CALCULATION (BEZET), ECG08: 438 MS
SERVICE CMNT-IMP: ABNORMAL
VENTRICULAR RATE, ECG03: 80 BPM

## 2021-05-07 ENCOUNTER — HOSPITAL ENCOUNTER (OUTPATIENT)
Dept: PREADMISSION TESTING | Age: 71
Discharge: HOME | End: 2021-05-07
Payer: MEDICARE

## 2021-05-07 PROCEDURE — U0003 INFECTIOUS AGENT DETECTION BY NUCLEIC ACID (DNA OR RNA); SEVERE ACUTE RESPIRATORY SYNDROME CORONAVIRUS 2 (SARS-COV-2) (CORONAVIRUS DISEASE [COVID-19]), AMPLIFIED PROBE TECHNIQUE, MAKING USE OF HIGH THROUGHPUT TECHNOLOGIES AS DESCRIBED BY CMS-2020-01-R: HCPCS

## 2021-05-07 RX ORDER — VANCOMYCIN/0.9 % SOD CHLORIDE 1.5G/250ML
1500 PLASTIC BAG, INJECTION (ML) INTRAVENOUS ONCE
Status: CANCELLED | OUTPATIENT
Start: 2021-05-11 | End: 2021-05-11

## 2021-05-07 RX ORDER — MUPIROCIN 20 MG/G
OINTMENT TOPICAL 2 TIMES DAILY
Qty: 22 G | Refills: 0 | Status: SHIPPED | OUTPATIENT
Start: 2021-05-07 | End: 2021-05-12

## 2021-05-07 NOTE — PROGRESS NOTES
Notification of Positive MRSA and Treatment Ordered by Preadmission Testing Nurse Practitioner      Patient Name:  Chiara Lin  MRN: [id number]  : 1950    Surgeon: Jennifer Villalobos  Date of surgery: 21  Procedure: LTH    Allergies: Allergies   Allergen Reactions    Latex Rash    Adhesive Contact Dermatitis       MRSA results: All Micro Results     Procedure Component Value Units Date/Time    MRSA CULTURE NARES [[id number]]  (Abnormal) Collected: 21 1333    Order Status: Completed Specimen: Nasal from Nares Updated: 21 1600     Special Requests: NO SPECIAL REQUESTS        Culture result: MRSA PRESENT               Screening of patient nares for MRSA is for surveillance purposes and, if positive, to facilitate isolation considerations in high risk settings. It is not intended for automatic decolonization interventions per se as regimens are not sufficiently effective to warrant routine use. Treatment ordered: Bactroban ointment 2%- apply intranasal twice daily for 5 days    Pre operative antibiotic will be changed to Vancomycin using weight based dosing (<80kg = 1gm, >80kg = 1.5gm) unless patient has allergy to Vancomycin.     Date patient notified of results / treatment prescribed: 21    The patient was instructed to add medication and date they began treatment to their \"Prior to Admission Medication\" list given to them in [address], NP

## 2021-05-07 NOTE — PERIOP NOTES
Patient notified of positive MRSA result. Medication instructions reviewed with patient and patient verbalized understanding.

## 2021-05-07 NOTE — H&P (VIEW-ONLY)
Notification of Positive MRSA and Treatment Ordered by Preadmission Testing Nurse Practitioner Patient Name:  Cyndie Boyd MRN: [id number] : 1950 Surgeon: Carmel Syed Date of surgery: 21 Procedure: [facility] Allergies: Allergies Allergen Reactions  Latex Rash  Adhesive Contact Dermatitis MRSA results: All Micro Results Procedure Component Value Units Date/Time MRSA CULTURE NARES [[id number]]  (Abnormal) Collected: 21 1333 Order Status: Completed Specimen: Nasal from Nares Updated: 21 1608 Special Requests: NO SPECIAL REQUESTS Culture result: MRSA PRESENT Screening of patient nares for MRSA is for surveillance purposes and, if positive, to facilitate isolation considerations in high risk settings. It is not intended for automatic decolonization interventions per se as regimens are not sufficiently effective to warrant routine use. Treatment ordered: Bactroban ointment 2%- apply intranasal twice daily for 5 days Pre operative antibiotic will be changed to Vancomycin using weight based dosing (<80kg = 1gm, >80kg = 1.5gm) unless patient has allergy to Vancomycin. Date patient notified of results / treatment prescribed: 21 The patient was instructed to add medication and date they began treatment to their \"Prior to Admission Medication\" list given to them in Preadmission Testing Sailaja Dennis NP

## 2021-05-08 LAB — SARS-COV-2, COV2NT: NOT DETECTED

## 2021-05-10 ENCOUNTER — ANESTHESIA EVENT (OUTPATIENT)
Dept: SURGERY | Age: 71
End: 2021-05-10
Payer: MEDICARE

## 2021-05-11 ENCOUNTER — ANESTHESIA (OUTPATIENT)
Dept: SURGERY | Age: 71
End: 2021-05-11
Payer: MEDICARE

## 2021-05-11 ENCOUNTER — HOSPITAL ENCOUNTER (OUTPATIENT)
Age: 71
Setting detail: OBSERVATION
Discharge: HOME | End: 2021-05-12
Attending: ORTHOPAEDIC SURGERY | Admitting: ORTHOPAEDIC SURGERY
Payer: MEDICARE

## 2021-05-11 ENCOUNTER — APPOINTMENT (OUTPATIENT)
Dept: GENERAL RADIOLOGY | Age: 71
End: 2021-05-11
Attending: STUDENT IN AN ORGANIZED HEALTH CARE EDUCATION/TRAINING PROGRAM
Payer: MEDICARE

## 2021-05-11 ENCOUNTER — APPOINTMENT (OUTPATIENT)
Dept: GENERAL RADIOLOGY | Age: 71
End: 2021-05-11
Attending: ORTHOPAEDIC SURGERY
Payer: MEDICARE

## 2021-05-11 DIAGNOSIS — M16.12 PRIMARY OSTEOARTHRITIS OF LEFT HIP: Primary | ICD-10-CM

## 2021-05-11 PROCEDURE — 76210000041 HC AMBSU PH I REC 4.5 TO 5 HR: Performed by: ORTHOPAEDIC SURGERY

## 2021-05-11 PROCEDURE — 97161 PT EVAL LOW COMPLEX 20 MIN: CPT

## 2021-05-11 PROCEDURE — 77030038587 HC LNR BOOT DISP ALLN -B: Performed by: ORTHOPAEDIC SURGERY

## 2021-05-11 PROCEDURE — 77030041397 HC DRSG PRIMASEAL AG MDII -B: Performed by: ORTHOPAEDIC SURGERY

## 2021-05-11 PROCEDURE — C1776 JOINT DEVICE (IMPLANTABLE): HCPCS | Performed by: ORTHOPAEDIC SURGERY

## 2021-05-11 PROCEDURE — 77030018842 HC SOL IRR SOD CL 9% BAXT -A: Performed by: ORTHOPAEDIC SURGERY

## 2021-05-11 PROCEDURE — 77030020788: Performed by: ORTHOPAEDIC SURGERY

## 2021-05-11 PROCEDURE — 74011250636 HC RX REV CODE- 250/636: Performed by: STUDENT IN AN ORGANIZED HEALTH CARE EDUCATION/TRAINING PROGRAM

## 2021-05-11 PROCEDURE — 77030035236 HC SUT PDS STRATFX BARB J&J -B: Performed by: ORTHOPAEDIC SURGERY

## 2021-05-11 PROCEDURE — 77030036660

## 2021-05-11 PROCEDURE — 77030040361 HC SLV COMPR DVT MDII -B

## 2021-05-11 PROCEDURE — 74011250636 HC RX REV CODE- 250/636: Performed by: NURSE ANESTHETIST, CERTIFIED REGISTERED

## 2021-05-11 PROCEDURE — 77030042316 HC BLD SAW -B: Performed by: ORTHOPAEDIC SURGERY

## 2021-05-11 PROCEDURE — 74011250636 HC RX REV CODE- 250/636: Performed by: ANESTHESIOLOGY

## 2021-05-11 PROCEDURE — 76030000020 HC AMB SURG 1.5 TO 2 HR INTENSV-TIER 1: Performed by: ORTHOPAEDIC SURGERY

## 2021-05-11 PROCEDURE — 77030007866 HC KT SPN ANES BBMI -B

## 2021-05-11 PROCEDURE — 74011000250 HC RX REV CODE- 250

## 2021-05-11 PROCEDURE — 74011000250 HC RX REV CODE- 250: Performed by: NURSE ANESTHETIST, CERTIFIED REGISTERED

## 2021-05-11 PROCEDURE — 74011000250 HC RX REV CODE- 250: Performed by: ORTHOPAEDIC SURGERY

## 2021-05-11 PROCEDURE — 74011250636 HC RX REV CODE- 250/636: Performed by: NURSE PRACTITIONER

## 2021-05-11 PROCEDURE — 2709999900 HC NON-CHARGEABLE SUPPLY: Performed by: ORTHOPAEDIC SURGERY

## 2021-05-11 PROCEDURE — 74011250637 HC RX REV CODE- 250/637: Performed by: ANESTHESIOLOGY

## 2021-05-11 PROCEDURE — 99218 HC RM OBSERVATION: CPT

## 2021-05-11 PROCEDURE — 74011000250 HC RX REV CODE- 250: Performed by: STUDENT IN AN ORGANIZED HEALTH CARE EDUCATION/TRAINING PROGRAM

## 2021-05-11 PROCEDURE — 97116 GAIT TRAINING THERAPY: CPT

## 2021-05-11 PROCEDURE — 76060000063 HC AMB SURG ANES 1.5 TO 2 HR: Performed by: ORTHOPAEDIC SURGERY

## 2021-05-11 PROCEDURE — 74011250637 HC RX REV CODE- 250/637: Performed by: STUDENT IN AN ORGANIZED HEALTH CARE EDUCATION/TRAINING PROGRAM

## 2021-05-11 PROCEDURE — 77030031139 HC SUT VCRL2 J&J -A: Performed by: ORTHOPAEDIC SURGERY

## 2021-05-11 PROCEDURE — 74011250636 HC RX REV CODE- 250/636: Performed by: ORTHOPAEDIC SURGERY

## 2021-05-11 PROCEDURE — 72170 X-RAY EXAM OF PELVIS: CPT

## 2021-05-11 PROCEDURE — 77030011264 HC ELECTRD BLD EXT COVD -A: Performed by: ORTHOPAEDIC SURGERY

## 2021-05-11 PROCEDURE — 77030033138 HC SUT PGA STRATFX J&J -B: Performed by: ORTHOPAEDIC SURGERY

## 2021-05-11 PROCEDURE — 74011250636 HC RX REV CODE- 250/636

## 2021-05-11 PROCEDURE — 77030010507 HC ADH SKN DERMBND J&J -B: Performed by: ORTHOPAEDIC SURGERY

## 2021-05-11 DEVICE — HIP H2 TOT ADV OTHER HD IMPL CAPPED SYNTHES: Type: IMPLANTABLE DEVICE | Status: FUNCTIONAL

## 2021-05-11 DEVICE — PINNACLE GRIPTION ACETABULAR SHELL SECTOR 50MM OD
Type: IMPLANTABLE DEVICE | Site: HIP | Status: FUNCTIONAL
Brand: PINNACLE GRIPTION

## 2021-05-11 DEVICE — PINNACLE HIP SOLUTIONS ALTRX POLYETHYLENE ACETABULAR LINER NEUTRAL 32MM ID 50MM OD
Type: IMPLANTABLE DEVICE | Site: HIP | Status: FUNCTIONAL
Brand: PINNACLE ALTRX

## 2021-05-11 DEVICE — BIOLOX DELTA CERAMIC FEMORAL HEAD 32MM DIA +1 12/14 TAPER
Type: IMPLANTABLE DEVICE | Site: HIP | Status: FUNCTIONAL
Brand: BIOLOX DELTA

## 2021-05-11 RX ORDER — OXYCODONE HCL 10 MG/1
10 TABLET, FILM COATED, EXTENDED RELEASE ORAL ONCE
Status: COMPLETED | OUTPATIENT
Start: 2021-05-11 | End: 2021-05-11

## 2021-05-11 RX ORDER — BUPIVACAINE HYDROCHLORIDE 5 MG/ML
INJECTION, SOLUTION EPIDURAL; INTRACAUDAL AS NEEDED
Status: DISCONTINUED | OUTPATIENT
Start: 2021-05-11 | End: 2021-05-11 | Stop reason: HOSPADM

## 2021-05-11 RX ORDER — ROSUVASTATIN CALCIUM 5 MG/1
5 TABLET, COATED ORAL
Status: DISCONTINUED | OUTPATIENT
Start: 2021-05-12 | End: 2021-05-12 | Stop reason: HOSPADM

## 2021-05-11 RX ORDER — CELECOXIB 100 MG/1
100 CAPSULE ORAL ONCE
Status: COMPLETED | OUTPATIENT
Start: 2021-05-11 | End: 2021-05-11

## 2021-05-11 RX ORDER — LOSARTAN POTASSIUM 50 MG/1
100 TABLET ORAL DAILY
Status: DISCONTINUED | OUTPATIENT
Start: 2021-05-12 | End: 2021-05-12 | Stop reason: HOSPADM

## 2021-05-11 RX ORDER — SODIUM CHLORIDE, SODIUM LACTATE, POTASSIUM CHLORIDE, CALCIUM CHLORIDE 600; 310; 30; 20 MG/100ML; MG/100ML; MG/100ML; MG/100ML
100 INJECTION, SOLUTION INTRAVENOUS CONTINUOUS
Status: DISCONTINUED | OUTPATIENT
Start: 2021-05-11 | End: 2021-05-11 | Stop reason: HOSPADM

## 2021-05-11 RX ORDER — KETOROLAC TROMETHAMINE 30 MG/ML
15 INJECTION, SOLUTION INTRAMUSCULAR; INTRAVENOUS
Status: COMPLETED | OUTPATIENT
Start: 2021-05-11 | End: 2021-05-11

## 2021-05-11 RX ORDER — LIDOCAINE HYDROCHLORIDE 10 MG/ML
0.1 INJECTION, SOLUTION EPIDURAL; INFILTRATION; INTRACAUDAL; PERINEURAL AS NEEDED
Status: DISCONTINUED | OUTPATIENT
Start: 2021-05-11 | End: 2021-05-12 | Stop reason: HOSPADM

## 2021-05-11 RX ORDER — POLYETHYLENE GLYCOL 3350 17 G/17G
17 POWDER, FOR SOLUTION ORAL DAILY
Status: DISCONTINUED | OUTPATIENT
Start: 2021-05-12 | End: 2021-05-12 | Stop reason: HOSPADM

## 2021-05-11 RX ORDER — HYDROCHLOROTHIAZIDE 25 MG/1
25 TABLET ORAL DAILY
Status: DISCONTINUED | OUTPATIENT
Start: 2021-05-12 | End: 2021-05-12 | Stop reason: HOSPADM

## 2021-05-11 RX ORDER — ACETAMINOPHEN 325 MG/1
650 TABLET ORAL
Status: DISCONTINUED | OUTPATIENT
Start: 2021-05-11 | End: 2021-05-12 | Stop reason: HOSPADM

## 2021-05-11 RX ORDER — FACIAL-BODY WIPES
10 EACH TOPICAL DAILY PRN
Status: DISCONTINUED | OUTPATIENT
Start: 2021-05-13 | End: 2021-05-12 | Stop reason: HOSPADM

## 2021-05-11 RX ORDER — VANCOMYCIN HYDROCHLORIDE 1 G/20ML
INJECTION, POWDER, LYOPHILIZED, FOR SOLUTION INTRAVENOUS AS NEEDED
Status: DISCONTINUED | OUTPATIENT
Start: 2021-05-11 | End: 2021-05-11 | Stop reason: HOSPADM

## 2021-05-11 RX ORDER — HYDROMORPHONE HYDROCHLORIDE 1 MG/ML
0.5 INJECTION, SOLUTION INTRAMUSCULAR; INTRAVENOUS; SUBCUTANEOUS
Status: DISCONTINUED | OUTPATIENT
Start: 2021-05-11 | End: 2021-05-12 | Stop reason: HOSPADM

## 2021-05-11 RX ORDER — FENTANYL CITRATE 50 UG/ML
INJECTION, SOLUTION INTRAMUSCULAR; INTRAVENOUS AS NEEDED
Status: DISCONTINUED | OUTPATIENT
Start: 2021-05-11 | End: 2021-05-11 | Stop reason: HOSPADM

## 2021-05-11 RX ORDER — VANCOMYCIN/0.9 % SOD CHLORIDE 1.5G/250ML
1500 PLASTIC BAG, INJECTION (ML) INTRAVENOUS ONCE
Status: COMPLETED | OUTPATIENT
Start: 2021-05-11 | End: 2021-05-11

## 2021-05-11 RX ORDER — SODIUM CHLORIDE 9 MG/ML
125 INJECTION, SOLUTION INTRAVENOUS CONTINUOUS
Status: DISCONTINUED | OUTPATIENT
Start: 2021-05-11 | End: 2021-05-12 | Stop reason: HOSPADM

## 2021-05-11 RX ORDER — ACETAMINOPHEN 325 MG/1
975 TABLET ORAL ONCE
Status: COMPLETED | OUTPATIENT
Start: 2021-05-11 | End: 2021-05-11

## 2021-05-11 RX ORDER — NALOXONE HYDROCHLORIDE 0.4 MG/ML
0.4 INJECTION, SOLUTION INTRAMUSCULAR; INTRAVENOUS; SUBCUTANEOUS AS NEEDED
Status: DISCONTINUED | OUTPATIENT
Start: 2021-05-11 | End: 2021-05-12 | Stop reason: HOSPADM

## 2021-05-11 RX ORDER — HYDROCODONE BITARTRATE AND ACETAMINOPHEN 5; 325 MG/1; MG/1
1 TABLET ORAL
Status: DISCONTINUED | OUTPATIENT
Start: 2021-05-11 | End: 2021-05-12 | Stop reason: HOSPADM

## 2021-05-11 RX ORDER — EPHEDRINE SULFATE/0.9% NACL/PF 50 MG/5 ML
SYRINGE (ML) INTRAVENOUS AS NEEDED
Status: DISCONTINUED | OUTPATIENT
Start: 2021-05-11 | End: 2021-05-11 | Stop reason: HOSPADM

## 2021-05-11 RX ORDER — HYDROMORPHONE HYDROCHLORIDE 1 MG/ML
.25-1 INJECTION, SOLUTION INTRAMUSCULAR; INTRAVENOUS; SUBCUTANEOUS
Status: DISCONTINUED | OUTPATIENT
Start: 2021-05-11 | End: 2021-05-11 | Stop reason: HOSPADM

## 2021-05-11 RX ORDER — KETOROLAC TROMETHAMINE 30 MG/ML
15 INJECTION, SOLUTION INTRAMUSCULAR; INTRAVENOUS EVERY 6 HOURS
Status: COMPLETED | OUTPATIENT
Start: 2021-05-11 | End: 2021-05-12

## 2021-05-11 RX ORDER — POVIDONE-IODINE 10 %
SOLUTION, NON-ORAL TOPICAL AS NEEDED
Status: DISCONTINUED | OUTPATIENT
Start: 2021-05-11 | End: 2021-05-11 | Stop reason: HOSPADM

## 2021-05-11 RX ORDER — CEFAZOLIN SODIUM/WATER 2 G/20 ML
2 SYRINGE (ML) INTRAVENOUS
Status: DISCONTINUED | OUTPATIENT
Start: 2021-05-11 | End: 2021-05-11 | Stop reason: SDUPTHER

## 2021-05-11 RX ORDER — ASPIRIN 81 MG/1
81 TABLET ORAL 2 TIMES DAILY
Status: DISCONTINUED | OUTPATIENT
Start: 2021-05-11 | End: 2021-05-12 | Stop reason: HOSPADM

## 2021-05-11 RX ORDER — MIDAZOLAM HYDROCHLORIDE 1 MG/ML
INJECTION, SOLUTION INTRAMUSCULAR; INTRAVENOUS AS NEEDED
Status: DISCONTINUED | OUTPATIENT
Start: 2021-05-11 | End: 2021-05-11 | Stop reason: HOSPADM

## 2021-05-11 RX ORDER — TRAMADOL HYDROCHLORIDE 50 MG/1
50 TABLET ORAL
Status: DISCONTINUED | OUTPATIENT
Start: 2021-05-11 | End: 2021-05-12 | Stop reason: HOSPADM

## 2021-05-11 RX ORDER — ALBUTEROL SULFATE 90 UG/1
2 AEROSOL, METERED RESPIRATORY (INHALATION)
Status: DISCONTINUED | OUTPATIENT
Start: 2021-05-11 | End: 2021-05-12 | Stop reason: HOSPADM

## 2021-05-11 RX ORDER — AMOXICILLIN 250 MG
1 CAPSULE ORAL 2 TIMES DAILY
Status: DISCONTINUED | OUTPATIENT
Start: 2021-05-11 | End: 2021-05-12 | Stop reason: HOSPADM

## 2021-05-11 RX ORDER — ONDANSETRON 2 MG/ML
INJECTION INTRAMUSCULAR; INTRAVENOUS AS NEEDED
Status: DISCONTINUED | OUTPATIENT
Start: 2021-05-11 | End: 2021-05-11 | Stop reason: HOSPADM

## 2021-05-11 RX ORDER — OXYCODONE HYDROCHLORIDE 5 MG/1
10 TABLET ORAL
Status: COMPLETED | OUTPATIENT
Start: 2021-05-11 | End: 2021-05-11

## 2021-05-11 RX ORDER — ONDANSETRON 2 MG/ML
4 INJECTION INTRAMUSCULAR; INTRAVENOUS
Status: DISCONTINUED | OUTPATIENT
Start: 2021-05-11 | End: 2021-05-12 | Stop reason: HOSPADM

## 2021-05-11 RX ORDER — PROPOFOL 10 MG/ML
INJECTION, EMULSION INTRAVENOUS AS NEEDED
Status: DISCONTINUED | OUTPATIENT
Start: 2021-05-11 | End: 2021-05-11 | Stop reason: HOSPADM

## 2021-05-11 RX ORDER — PROPOFOL 10 MG/ML
INJECTION, EMULSION INTRAVENOUS
Status: DISCONTINUED | OUTPATIENT
Start: 2021-05-11 | End: 2021-05-11 | Stop reason: HOSPADM

## 2021-05-11 RX ORDER — FENTANYL CITRATE 50 UG/ML
25 INJECTION, SOLUTION INTRAMUSCULAR; INTRAVENOUS
Status: DISCONTINUED | OUTPATIENT
Start: 2021-05-11 | End: 2021-05-11 | Stop reason: HOSPADM

## 2021-05-11 RX ORDER — SODIUM CHLORIDE, SODIUM LACTATE, POTASSIUM CHLORIDE, CALCIUM CHLORIDE 600; 310; 30; 20 MG/100ML; MG/100ML; MG/100ML; MG/100ML
100 INJECTION, SOLUTION INTRAVENOUS CONTINUOUS
Status: DISPENSED | OUTPATIENT
Start: 2021-05-11 | End: 2021-05-12

## 2021-05-11 RX ORDER — SODIUM CHLORIDE 0.9 % (FLUSH) 0.9 %
5-40 SYRINGE (ML) INJECTION AS NEEDED
Status: DISCONTINUED | OUTPATIENT
Start: 2021-05-11 | End: 2021-05-12 | Stop reason: HOSPADM

## 2021-05-11 RX ORDER — CELECOXIB 100 MG/1
200 CAPSULE ORAL 2 TIMES DAILY
Status: DISCONTINUED | OUTPATIENT
Start: 2021-05-12 | End: 2021-05-12 | Stop reason: HOSPADM

## 2021-05-11 RX ORDER — HYDROMORPHONE HYDROCHLORIDE 2 MG/1
4 TABLET ORAL
Status: DISCONTINUED | OUTPATIENT
Start: 2021-05-11 | End: 2021-05-12 | Stop reason: HOSPADM

## 2021-05-11 RX ORDER — DEXAMETHASONE SODIUM PHOSPHATE 4 MG/ML
4 INJECTION, SOLUTION INTRA-ARTICULAR; INTRALESIONAL; INTRAMUSCULAR; INTRAVENOUS; SOFT TISSUE EVERY 6 HOURS
Status: COMPLETED | OUTPATIENT
Start: 2021-05-11 | End: 2021-05-12

## 2021-05-11 RX ORDER — SODIUM CHLORIDE 0.9 % (FLUSH) 0.9 %
5-40 SYRINGE (ML) INJECTION EVERY 8 HOURS
Status: DISCONTINUED | OUTPATIENT
Start: 2021-05-11 | End: 2021-05-12 | Stop reason: HOSPADM

## 2021-05-11 RX ADMIN — ONDANSETRON HYDROCHLORIDE 4 MG: 2 SOLUTION INTRAMUSCULAR; INTRAVENOUS at 09:01

## 2021-05-11 RX ADMIN — SODIUM CHLORIDE 80 MCG: 9 INJECTION INTRAMUSCULAR; INTRAVENOUS; SUBCUTANEOUS at 08:41

## 2021-05-11 RX ADMIN — DOCUSATE SODIUM 50MG AND SENNOSIDES 8.6MG 1 TABLET: 8.6; 5 TABLET, FILM COATED ORAL at 17:57

## 2021-05-11 RX ADMIN — OXYCODONE 10 MG: 5 TABLET ORAL at 14:01

## 2021-05-11 RX ADMIN — DEXAMETHASONE SODIUM PHOSPHATE 4 MG: 4 INJECTION, SOLUTION INTRAMUSCULAR; INTRAVENOUS at 17:58

## 2021-05-11 RX ADMIN — HYDROMORPHONE HYDROCHLORIDE 0.5 MG: 1 INJECTION, SOLUTION INTRAMUSCULAR; INTRAVENOUS; SUBCUTANEOUS at 10:45

## 2021-05-11 RX ADMIN — BUPIVACAINE HYDROCHLORIDE 2.5 ML: 5 INJECTION, SOLUTION EPIDURAL; INTRACAUDAL; PERINEURAL at 07:09

## 2021-05-11 RX ADMIN — ACETAMINOPHEN 975 MG: 325 TABLET ORAL at 06:19

## 2021-05-11 RX ADMIN — VANCOMYCIN HYDROCHLORIDE 1500 MG: 10 INJECTION, POWDER, LYOPHILIZED, FOR SOLUTION INTRAVENOUS at 06:18

## 2021-05-11 RX ADMIN — OXYCODONE HYDROCHLORIDE 10 MG: 10 TABLET, FILM COATED, EXTENDED RELEASE ORAL at 06:19

## 2021-05-11 RX ADMIN — CEFAZOLIN 2 G: 1 INJECTION, POWDER, FOR SOLUTION INTRAMUSCULAR; INTRAVENOUS at 22:19

## 2021-05-11 RX ADMIN — CELECOXIB 100 MG: 100 CAPSULE ORAL at 06:19

## 2021-05-11 RX ADMIN — ASPIRIN 81 MG: 81 TABLET, COATED ORAL at 17:57

## 2021-05-11 RX ADMIN — SODIUM CHLORIDE 125 ML/HR: 9 INJECTION, SOLUTION INTRAVENOUS at 15:51

## 2021-05-11 RX ADMIN — PROPOFOL 50 MCG/KG/MIN: 10 INJECTION, EMULSION INTRAVENOUS at 07:40

## 2021-05-11 RX ADMIN — KETOROLAC TROMETHAMINE 15 MG: 30 INJECTION, SOLUTION INTRAMUSCULAR; INTRAVENOUS at 10:45

## 2021-05-11 RX ADMIN — Medication 10 ML: at 15:51

## 2021-05-11 RX ADMIN — CEFAZOLIN SODIUM 2 G: 1 POWDER, FOR SOLUTION INTRAMUSCULAR; INTRAVENOUS at 07:47

## 2021-05-11 RX ADMIN — KETOROLAC TROMETHAMINE 15 MG: 30 INJECTION, SOLUTION INTRAMUSCULAR; INTRAVENOUS at 23:46

## 2021-05-11 RX ADMIN — FENTANYL CITRATE 100 MCG: 0.05 INJECTION, SOLUTION INTRAMUSCULAR; INTRAVENOUS at 07:03

## 2021-05-11 RX ADMIN — PROPOFOL 50 MG: 10 INJECTION, EMULSION INTRAVENOUS at 07:40

## 2021-05-11 RX ADMIN — Medication 5 MG: at 08:58

## 2021-05-11 RX ADMIN — HYDROCODONE BITARTRATE AND ACETAMINOPHEN 1 TABLET: 5; 325 TABLET ORAL at 22:19

## 2021-05-11 RX ADMIN — SODIUM CHLORIDE 80 MCG: 9 INJECTION INTRAMUSCULAR; INTRAVENOUS; SUBCUTANEOUS at 07:54

## 2021-05-11 RX ADMIN — SODIUM CHLORIDE, POTASSIUM CHLORIDE, SODIUM LACTATE AND CALCIUM CHLORIDE 100 ML/HR: 600; 310; 30; 20 INJECTION, SOLUTION INTRAVENOUS at 06:18

## 2021-05-11 RX ADMIN — MIDAZOLAM HYDROCHLORIDE 2 MG: 2 INJECTION, SOLUTION INTRAMUSCULAR; INTRAVENOUS at 07:03

## 2021-05-11 RX ADMIN — DEXAMETHASONE SODIUM PHOSPHATE 4 MG: 4 INJECTION, SOLUTION INTRAMUSCULAR; INTRAVENOUS at 23:46

## 2021-05-11 RX ADMIN — CEFAZOLIN 2 G: 1 INJECTION, POWDER, FOR SOLUTION INTRAMUSCULAR; INTRAVENOUS at 15:50

## 2021-05-11 RX ADMIN — KETOROLAC TROMETHAMINE 15 MG: 30 INJECTION, SOLUTION INTRAMUSCULAR; INTRAVENOUS at 17:58

## 2021-05-11 RX ADMIN — Medication 10 ML: at 22:20

## 2021-05-11 NOTE — ANESTHESIA POSTPROCEDURE EVALUATION
Procedure(s):  LEFT TOTAL HIP ARTHROPLASTY WITH ANTERIOR APPROACH (SPINAL ). spinal    Anesthesia Post Evaluation      Multimodal analgesia: multimodal analgesia not used between 6 hours prior to anesthesia start to PACU discharge  Patient location during evaluation: PACU  Patient participation: complete - patient participated  Level of consciousness: awake  Pain management: adequate  Airway patency: patent  Anesthetic complications: no  Cardiovascular status: acceptable, blood pressure returned to baseline and hemodynamically stable  Respiratory status: acceptable  Hydration status: acceptable  Post anesthesia nausea and vomiting:  controlled      INITIAL Post-op Vital signs:   Vitals Value Taken Time   /67 05/11/21 1000   Temp 36.2 °C (97.2 °F) 05/11/21 0929   Pulse 69 05/11/21 1011   Resp 9 05/11/21 1011   SpO2 94 % 05/11/21 1011   Vitals shown include unvalidated device data.

## 2021-05-11 NOTE — ANESTHESIA PREPROCEDURE EVALUATION
Relevant Problems   No relevant active problems       Anesthetic History   No history of anesthetic complications            Review of Systems / Medical History  Patient summary reviewed and pertinent labs reviewed    Pulmonary            Asthma : well controlled       Neuro/Psych   Within defined limits           Cardiovascular    Hypertension              Exercise tolerance: >4 METS     GI/Hepatic/Renal           PUD     Endo/Other        Arthritis     Other Findings              Physical Exam    Airway  Mallampati: II  TM Distance: 4 - 6 cm  Neck ROM: normal range of motion   Mouth opening: Normal     Cardiovascular  Regular rate and rhythm,  S1 and S2 normal,  no murmur, click, rub, or gallop  Rhythm: regular  Rate: normal         Dental  No notable dental hx       Pulmonary  Breath sounds clear to auscultation               Abdominal  GI exam deferred       Other Findings            Anesthetic Plan    ASA: 2  Anesthesia type: spinal            Anesthetic plan and risks discussed with: Patient

## 2021-05-11 NOTE — PERIOP NOTES
PACU IN REPORT FROM ANESTHESIA    Verbal report received from   [facility]   [] MD/DO-Anesthesiologist    [x] CRNA   [] with student    CHOICE ANESTHESIA:  [] GENERAL  [] TIVA  [x] MAC  [] LOCAL  [] REGIONAL  [x] SPINAL   [] EPIDURAL   **Note the anesthesia record for medications given intraoperatively. **           [] E.R.A.S. PROTOCOL    SURGICAL PROCEDURE: Procedure(s) (LRB):  LEFT TOTAL HIP ARTHROPLASTY WITH ANTERIOR APPROACH (SPINAL ) (Left)     SURGEON: Kathy Camarillo MD.    Brief Initial Visual Assessment:    Patient Age: [] Infant(1-12mo)      []Pediatric(1-13yrs)    [] Adolescent(13-18yrs)    [] Adult(18-65yrs)      [x]Geriatric Adult(>65yrs). Patient    [] Alert           [x]Calm & Cooperative      [] Anxious  Appearance: [x] Drowsy      [] Sedated      [] Unresponsive     Oriented x  3            Airway:     [x] Patent                          [] Obstructs easily/Obstructed on arrival   [] \"Difficult Airway\" report by Anesthesia                        [] Airway improved with head/airway repositioning                       Airway Adjuncts Present: [] Oral Airway    [] Nasal Trumpet    [] ETT    [] LMA            Respiratory  [x] Even   [] Labored   [] Shallow   [] Tachypnea   [] Bradypnea  Pattern:    [x] Non-Labored  [] VENT and/or respiratory assistance     being provided. Skin:     [x] Pink [] Dusky    [] Pale        [x] Warm    [] Hot [] Cool       [] Cold   [x]Dry [] Moist [] Diaphoretic     Membranes:  [x] Pink [] Pale       [x] Moist [] Dry     [] Crusty     Pain:   [x] No Acute Discomfort. 0  /10 Scale [x] Verbal Numeric   [] Moderate Discomfort.      [] V.A.S. [] Acute Discomfort.                [] A.N.V.    [] Chronic-Issue Related Discomfort.   [] F.L.A.C.C. Note E-MAR for medications administered. []Faces, Martins/Baker    Note assessments documented in flowsheets; any assessment variants to be found in comments or narrative perioperative nurse notes. Post-anesthesia care now assumed by [facility] BSN, RN-BC

## 2021-05-11 NOTE — ROUTINE PROCESS
Bedside shift change report given to [address] [facility] (oncoming nurse) by Sherwin Frazier (offgoing nurse). Report included the following information SBAR.

## 2021-05-11 NOTE — INTERVAL H&P NOTE
Update History & Physical 
 
The Patient's History and Physical of May 5, 2021 was reviewed with the patient and I examined the patient. The surgical site was confirmed by the patient and me. We will plan for vanc and ancef preop with MRSA colonization. Plan:  The risk, benefits, expected outcome, and alternative to the recommended procedure have been discussed with the patient. Patient understands and wants to proceed with the procedure.  
 
Electronically signed by Rico Esteban MD on 5/11/2021 at 7:10 AM

## 2021-05-11 NOTE — ANESTHESIA PROCEDURE NOTES
Spinal Block    Start time: 5/11/2021 7:03 AM  End time: 5/11/2021 7:09 AM  Performed by: Pa Jenkins MD  Authorized by: Pa Jenkins MD     Pre-procedure:   Indications: at surgeon's request and primary anesthetic  Preanesthetic Checklist: patient identified, risks and benefits discussed, anesthesia consent, site marked, patient being monitored and timeout performed    Timeout Time: 07:03          Spinal Block:   Patient Position:  Seated  Prep Region:  Lumbar  Prep: chlorhexidine      Location:  L3-4  Technique:  Single shot        Needle:   Needle Type:  Pencan  Needle Gauge:  25 G  Attempts:  1      Events: CSF confirmed, no blood with aspiration and no paresthesia        Assessment:  Insertion:  Uncomplicated  Patient tolerance:  Patient tolerated the procedure well with no immediate complications

## 2021-05-11 NOTE — INTERVAL H&P NOTE
Update History & Physical 
 
The Patient's History and Physical of May 5, 2021 was reviewed with the patient and I examined the patient. There was no change. The surgical site was confirmed by the patient and me. Plan:  The risk, benefits, expected outcome, and alternative to the recommended procedure have been discussed with the patient. Patient understands and wants to proceed with the procedure.  
 
Electronically signed by Zaki Corbin MD on 5/11/2021 at 7:09 AM

## 2021-05-11 NOTE — PERIOP NOTES
POST ANESTHESIA CARE    DISCHARGE / TRANSFER NOTE  TRANSFER - OUT REPORT:    PHONE  report  WAS    given at 2:05 PM to   [address] [place] [address]  receiving   Shani Guy   and being transferred to       [facility]    for routine post - op  Following Spinal for Procedure(s) (LRB):  LEFT TOTAL HIP ARTHROPLASTY WITH ANTERIOR APPROACH (SPINAL ) (Left) by  Pao Crooks MD.    Patient Situation, Background, Assessment and Recommendations (SBAR) was provided and included information from the following SBAR report(s) (SBAR, OR Summary and MAR) which were reviewed with the receiving nurse. Lines:   Peripheral IV 05/11/21 Right Arm (Active)   Site Assessment Clean, dry, & intact 05/11/21 0929   Phlebitis Assessment 0 05/11/21 0929   Infiltration Assessment 0 05/11/21 0929   Dressing Status Clean, dry, & intact 05/11/21 0929   Dressing Type Transparent;Tape 05/11/21 0929   Hub Color/Line Status Patent; Infusing 05/11/21 0929      Also Reviewed (checked if applicable):   [] NO ADDITIONAL REVIEWS  [] PCA Drug and Settings     [x] Cold Therapy with extra gels     [] On-Q @  ml/hr   [] Drains x       [] Significant Wound care/devices (e.g. Wound vac, etc.)     [] Holding pt in PACU awaiting bed availability - additional care provided and eMAR review  [] Vent Settings      [] Critical Care Drips  Contact Precautions: Contact  Patient transported with:  Registered Nurse    Shani Brooks was   transferred   via   Bed    to   hospital room 407    . Patient was escorted by     nurse  . Patient verbalized   appreciation and was very pleased with care received throughout their stay. Patient was discharged in     pleasant mood     . Pain at discharge/transfer was      2-3  /10.     Discharge, medication and follow-up instructions were verbalized as understood prior to discharge  (if applicable for same-day procedures being discharged.)    All personal belongings have been returned to patient, and patient/family verbally confirm receiving belongings as all present. Additional Information: Interval bedside SBAR report was completed at 1430. There were:  [x] No changes to patient condition since last assessment and/or communication with receiving RN.    [] There were changes to patient care/condition since last communication with receiving RN and were reviewed at        verbal bedside SBAR change of staff. Patient is in:   [x] No Acute Distress     [] Mild/Moderate Acute discomfort - treated and controlled  [] Acute dicomfort/distress - treated but still not fully controlled  [] Acute dicomfort/distress reported, however maximum allowable dosing has been achieved and no further        doses allowed    Vital Signs are: [x] Stable - consistent with end of PACU care. [] Unstable -  receiving continued care in appropriate setting    [x] 2 RN skin check completed with receiving RN. [x] Spouse/family/caregiver at bedside during/after staff handoff of care. [] No Spouse/family/caregiver with the patient at this time. After report, opportunity for questions and clarification was provided.     Signed: Mary VINES RN-BC

## 2021-05-11 NOTE — OP NOTES
OPERATIVE REPORT     Admit Date: 5/11/2021  Admit Diagnosis: Primary osteoarthritis of left hip [M16.12]    Date of Procedure: 5/11/2021   Preoperative Diagnosis: PRIMARY OSTEOARHRITIS OF LEFT HIP  Postoperative Diagnosis: PRIMARY OSTEOARHRITIS OF LEFT HIP    Procedure: Procedure(s):  LEFT TOTAL HIP ARTHROPLASTY WITH ANTERIOR APPROACH (SPINAL )  Surgeon: Jerry Mitchell MD  Assistant(s): SAMUEL Zuñiga  Anesthesia: Spinal   Antibiotic: ANCEF 2 g, 1.5 g Vancomycin  Estimated Blood Loss: 200  Specimens: * No specimens in log *   Complications: None    INDICATIONS:    This is a 78y.o.-year-old patient with a long history of severe left hip pain secondary to degenerative joint disease. After review of the appropriate imaging studies, a detailed physical examination and history, it was recommended that the patient undergo left total hip arthroplasty as the patient had failed conservative management. The pertinent risks and benefits of surgery have been discussed with the patient including infection, wound complication, bleeding, transfusion, fracture, neurovascular injury, leg length inequality, dissatisfaction with surgery, venothromboembolism, medical complication and death. The patient understands the risks as outlined and elects to proceed with surgery. PROCEDURE IN DETAIL:   Prior to initiation of the operative procedure a surgical time-out was taken and the patient's name, medical record, number, date-of-birth, and operative side was verified with the surgical consent form. Additionally it was verified that the appropriate montez-operative antibiotic administration was completed, as well as that radiographs were available and the correct operative instrumentation and implants were available. The patient was brought to the operating room suite and after appropriate anesthesia care, was placed into well-padded boots on both lower extremities.   The patient was then transferred to the [facility] table for final positioning. All bony prominences were well padded. The operative site was cleansed with alcohol and chloroprep and the extremity draped in a sterile fashion. The skin overlying the tensor fascia kellie muscle was incised sharply and the dissection was carried down to the overlying muscle belly. Hemostasis was obtained with the use of electrocautery. Sharp dissection of the overlying fascia of the TFL was performed and bluntly dissected off the underlying muscle belly. The TFL was retracted laterally and then deeper dissection to obtain control and hemostasis of the ascending branch of the lateral circumflex artery was performed. Deeper dissection to the overlying hip capsule was then performed. With the femoral neck identified, extracapsular retractors were then placed and the hip capsule was incised. The inferior/anterior limb of the iliofemoral ligament was tagged for retraction and the superior/anterior limb was removed. Retractors were then placed inside the joint capsule and our neck cut performed. The residual femoral head was then removed and sized. Periacetabular retractors were then placed. Residual acetabular labrum and pulvinar soft tissue was excised. Sequential reaming was performed to obtain an appropriate peripheral rim fit. An even sized trial acetabulum was utilized one size larger than the last reamer to test component fit. The final acetabular component was brought up to the back table and impacted utilizing fluoroscopy and native anatomy to assess cup inclination and version. With the cup fully seated,  the final polyethylene liner was impacted once cup confimed to be in appropriate position with fluoroscopy. Attention was then turned to the femur. After release of the lateral capsular attachment on the femur and release of the piriformis attachment, the femur was elevated and delivered into the field.   Retractors were placed around the calcar and under the lateral edge of the greater trochanter. The the box osteotome followed by the canal finder was utilized. Sequential broaching was then performed to obtain appropriate press fit. Neck trails and head balls were also trialed to obtain the appropriate leg length and offset to match the contralateral hip with the help of fluoroscopy. With the appropriate combination in place, the femur was externally rotated to 90 degrees to assess for anterior instability. Once we were satisfied with our femoral component combination, the hip was dislocated and fit of the final broach checked to ensure it was not loose. The calcar planer was then used and the final broach removed. The final femoral component was then fully impacted and seated, no fractures were identified. The trunnion was then cleaned and the final head ball was impacted and fully seated. The hip was reduced and final fluoroscopic images were taken. The wound was irrigated with saline and a dilute betadine solution and checked for excessive bleeding. The capsular tagging suture was removed and the fascia overlying the TFL was closed in a running fashion with #1 stratfix barbed suture. After placement of vancomycin powder. The subcuticular layer was closed utilizing 2-0 interrupted buried vicryl and a running 3-0 monocryl stratfix barbed suture in the dermis. Skin sealant was then utilized and waterproof dressing applied. The patient was then transferred from the [facility] table to their hospital bed and to the post-anesthesia care unit without difficulty. JUSTIFICATION FOR SURGICAL ASSISTANT:   Surgical Assistant, eYssenia Rooney PA-C, was requried and necessary in this case, to help with soft tissue retraction, extremity positioning, equiment management, implant management, and wound closure. POST-OPERATIVE:    - Weight bearing as tolerated on operative extremity. No extreme ROM of operative hip.    - Twenty-four hours of post-operative IV antibiotics. - Pharmacologic DVT prophylaxis for one month with mechanical prophylaxis in the hospital.  The patient will receive ASA 81mg BID for 30 days     IMPLANTS :  Silver Curve [place] Gription 50 mm, 32/50 neutral ALTRX liner, 32 +1 ceramic head, Actis 4 STD femoral stem. Implant Name Type Inv.  Item Serial No.  Lot No. LRB No. Used Action   HEAD FEM [id number] +1MM OFFSET 12/14 TAPR HIP CERAMIC BIOLOX - SN/A  HEAD FEM [id number] +1MM OFFSET 12/14 TAPR HIP CERAMIC BIOLOX N/A [facility] [employer] [facility] [id number] Left 1 Implanted   FEMORAL STEM 12/14 TAPER ACTIS DUOFIX PROSTHESIS CEMENTLESS   N/A [employer] [id number] Left 1 Implanted   LINER ACET OD50MM ID32MM NEUT OFFSET HIP ALTRX PINN - [id number]  LINER ACET OD50MM ID32MM NEUT OFFSET HIP ALTRX PINN  [facility] [employer] [facility] [id number] Left 1 Implanted   CUP ACET [id number] HIP GRIPTION NIELS CEMENTLESS FIX SECT SER - [id number]  CUP ACET [id number] HIP GRIPTION NIELS CEMENTLESS FIX SECT SER  Fredrick Paul[employer]_ [id number] Left 1 Implanted       Mitra Collier MD

## 2021-05-11 NOTE — PROGRESS NOTES
Problem: Mobility Impaired (Adult and Pediatric)  Goal: *Acute Goals and Plan of Care (Insert Text)  Description: FUNCTIONAL STATUS PRIOR TO ADMISSION: Patient was independent and active without use of DME.    HOME SUPPORT PRIOR TO ADMISSION: The patient lived with SO but did not require assist.    Physical Therapy Goals  Initiated 5/11/2021    1. Patient will move from supine to sit and sit to supine  in bed with modified independence within 4 days. 2. Patient will perform sit to stand with modified independence within 4 days. 3. Patient will ambulate with modified independence for 100 feet with the least restrictive device within 4 days. 4. Patient will ascend/descend 4 stairs with 1 handrail(s) with minimal assistance/contact guard assist within 4 days. 5. Patient will verbalize and demonstrate understanding of anterior hip precautions per protocol within 4 days. 6. Patient will perform total hip home exercise program per protocol with independence within 4 days. Outcome: Progressing Towards Goal   PHYSICAL THERAPY EVALUATION  Patient: Lani Flores (68 y.o. female)  Date: 5/11/2021  Primary Diagnosis: Primary osteoarthritis of left hip [M16.12]  Procedure(s) (LRB):  LEFT TOTAL HIP ARTHROPLASTY WITH ANTERIOR APPROACH (SPINAL ) (Left) Day of Surgery   Precautions:   WBAT, Fall, Total hip(no extremes of motion on operated hip); Contact    ASSESSMENT  Based on the objective data described below, the patient presents with decreased ROM and strength to LLE, decreased bed mobility, transfers and functional ambulation following admission for left NORY, anterior. Patient will need OP PT upon discharge. She has all DME. Current Level of Function Impacting Discharge (mobility/balance): Educated patient regarding no extremes of motion on operative hip and of proper exercises. Handout provided and she expressed understanding.  Patient stood and ambulated 8 feet with rolling walker +2 min assist. Cues to maintain sequence. Vitals stable. Functional Outcome Measure: The patient scored 50/100 on the Barthel outcome measure. Other factors to consider for discharge: none     Patient will benefit from skilled therapy intervention to address the above noted impairments. PLAN :  Recommendations and Planned Interventions: bed mobility training, transfer training, gait training, and therapeutic exercises      Frequency/Duration: Patient will be followed by physical therapy:  twice daily to address goals. Recommendation for discharge: (in order for the patient to meet his/her long term goals)  Outpatient physical therapy follow up recommended for NORY    This discharge recommendation:  Has not yet been discussed the attending provider and/or case management    IF patient discharges home will need the following DME: patient owns DME required for discharge         SUBJECTIVE:   Patient stated I am doing good.     OBJECTIVE DATA SUMMARY:   HISTORY:    Past Medical History:   Diagnosis Date    Adverse effect of anesthesia     Dentist told patient never to take any anesthesia with a vasoconstricter    Asthma     COVID-19 vaccine series completed     Pfizer    Fibrocystic breast 10/29/2014    Hypertension     Kidney stones     MRSA nasal colonization 05/05/2021    PUD (peptic ulcer disease)     Seasonal allergic rhinitis      Past Surgical History:   Procedure Laterality Date    HX BREAST BIOPSY Right 2000 & 2007    HX CARPAL TUNNEL RELEASE Bilateral     HX CERVICAL FUSION  2017    HX GYN  1999    hysterectomy    HX HEENT      wisdom teeth    HX ORTHOPAEDIC  01/1996    neck - C 5,6 fused    HX ORTHOPAEDIC  06/2017    spinal fusion    SUSAN[facility]H US BX BREAST RT VAC ASST Right 2005    2005 US right breast biopsy; benign    AL BREAST SURGERY PROCEDURE UNLISTED  4/20/00    Dorlene Doheny: excision rt br cystic material    AL BREAST SURGERY PROCEDURE UNLISTED  2/22/07    Dorlene Doheny: rt br biopsy w/ wire loc       Personal factors and/or comorbidities impacting plan of care: none    Home Situation  Rails to Enter: Yes  One/Two Story Residence: Two story, live on 1st floor  Living Alone: No  Support Systems: Family member(s), Spouse/Significant Other/Partner  Current DME Used/Available at Home: Cane, straight, Walker, rolling, Blood pressure cuff, Adaptive dressing aides, Commode, bedside, Shower chair, Grab bars  Tub or Shower Type: Shower    EXAMINATION/PRESENTATION/DECISION MAKING:   Critical Behavior:  Neurologic State: Alert  Orientation Level: Oriented X4     Safety/Judgement: Good awareness of safety precautions       Skin:  not fully observed    Range Of Motion:  AROM: Within functional limits(LLE less due to surgery)                       Strength:    Strength: Within functional limits(LLE less due to surgery)                    Tone & Sensation:   Tone: Normal              Sensation: Intact                       Functional Mobility:  Bed Mobility:     Supine to Sit: Minimum assistance; Additional time  Sit to Supine: Assist x2;Minimum assistance  Scooting: Contact guard assistance  Transfers:  Sit to Stand: Assist x2;Minimum assistance  Stand to Sit: Assist x2;Minimum assistance                       Balance:   Sitting: Intact  Standing: Intact; With support  Ambulation/Gait Training:  Distance (ft): 8 Feet (ft)  Assistive Device: Gait belt;Walker, rolling  Ambulation - Level of Assistance: Assist x2;Minimal assistance        Gait Abnormalities: Step to gait     Left Side Weight Bearing: As tolerated                                         Therapeutic Exercises: Ankle pumps, quad, heel and gluteal sets, heel slides, internal rotation, hip abduction    Functional Measure:  Barthel Index:    Bathin  Bladder: 10  Bowels: 10  Groomin  Dressin  Feeding: 10  Mobility: 0  Stairs: 0  Toilet Use: 5  Transfer (Bed to Chair and Back): 5  Total: 50/100       The Barthel ADL Index: Guidelines  1.  The index should be used as a record of what a patient does, not as a record of what a patient could do. 2. The main aim is to establish degree of independence from any help, physical or verbal, however minor and for whatever reason. 3. The need for supervision renders the patient not independent. 4. A patient's performance should be established using the best available evidence. Asking the patient, friends/relatives and nurses are the usual sources, but direct observation and common sense are also important. However direct testing is not needed. 5. Usually the patient's performance over the preceding 24-48 hours is important, but occasionally longer periods will be relevant. 6. Middle categories imply that the patient supplies over 50 per cent of the effort. 7. Use of aids to be independent is allowed. Gela Perdue., Barthel, D.W. (4455). Functional evaluation: the Barthel Index. [address] [facility] (14)2. [facility] Fraction rodrigo GREGG Shipley, Ripley Brittle., Shoaib Sutherland., [place], [address] [facility] (1999). Measuring the change indisability after inpatient rehabilitation; comparison of the responsiveness of the Barthel Index and Functional [place] Measure. Journal of Neurology, Neurosurgery, and Psychiatry, 66(4), 094-936. Marcella Perez, N.J.A, STEPHANIE Tabor.JOSE, & Ap Smith, M.A. (2004.) Assessment of post-stroke quality of life in cost-effectiveness studies: The usefulness of the Barthel Index and the EuroQoL-5D.  Quality of Life Research, 15, 432-43           Physical Therapy Evaluation Charge Determination   History Examination Presentation Decision-Making   LOW Complexity : Zero comorbidities / personal factors that will impact the outcome / POC LOW Complexity : 1-2 Standardized tests and measures addressing body structure, function, activity limitation and / or participation in recreation  LOW Complexity : Stable, uncomplicated  Other outcome measures Barthel  LOW       Based on the above components, the patient evaluation is determined to be of the following complexity level: LOW     Pain Rating:  None at this time    Activity Tolerance:   Good    After treatment patient left in no apparent distress:   Supine in bed, Call bell within reach, Bed / chair alarm activated, and Side rails x 3    COMMUNICATION/EDUCATION:   The patients plan of care was discussed with: Registered nurse. Fall prevention education was provided and the patient/caregiver indicated understanding. and Patient/family agree to work toward stated goals and plan of care.     Thank you for this referral.  Liana Tripathi, PT   Time Calculation: 25 mins

## 2021-05-12 VITALS
RESPIRATION RATE: 18 BRPM | SYSTOLIC BLOOD PRESSURE: 132 MMHG | OXYGEN SATURATION: 94 % | HEIGHT: 64 IN | WEIGHT: 191.36 LBS | BODY MASS INDEX: 32.67 KG/M2 | DIASTOLIC BLOOD PRESSURE: 74 MMHG | HEART RATE: 93 BPM | TEMPERATURE: 97.8 F

## 2021-05-12 LAB — HGB BLD-MCNC: 12 G/DL (ref 11.5–16)

## 2021-05-12 PROCEDURE — 97116 GAIT TRAINING THERAPY: CPT

## 2021-05-12 PROCEDURE — 97530 THERAPEUTIC ACTIVITIES: CPT

## 2021-05-12 PROCEDURE — 74011250636 HC RX REV CODE- 250/636: Performed by: STUDENT IN AN ORGANIZED HEALTH CARE EDUCATION/TRAINING PROGRAM

## 2021-05-12 PROCEDURE — 99218 HC RM OBSERVATION: CPT

## 2021-05-12 PROCEDURE — 85018 HEMOGLOBIN: CPT

## 2021-05-12 PROCEDURE — 36415 COLL VENOUS BLD VENIPUNCTURE: CPT

## 2021-05-12 PROCEDURE — 74011250637 HC RX REV CODE- 250/637: Performed by: STUDENT IN AN ORGANIZED HEALTH CARE EDUCATION/TRAINING PROGRAM

## 2021-05-12 PROCEDURE — 96374 THER/PROPH/DIAG INJ IV PUSH: CPT

## 2021-05-12 PROCEDURE — 74011000250 HC RX REV CODE- 250: Performed by: STUDENT IN AN ORGANIZED HEALTH CARE EDUCATION/TRAINING PROGRAM

## 2021-05-12 PROCEDURE — 97535 SELF CARE MNGMENT TRAINING: CPT | Performed by: OCCUPATIONAL THERAPIST

## 2021-05-12 PROCEDURE — 97165 OT EVAL LOW COMPLEX 30 MIN: CPT | Performed by: OCCUPATIONAL THERAPIST

## 2021-05-12 PROCEDURE — 97110 THERAPEUTIC EXERCISES: CPT

## 2021-05-12 PROCEDURE — 94760 N-INVAS EAR/PLS OXIMETRY 1: CPT

## 2021-05-12 RX ORDER — ASPIRIN 81 MG/1
81 TABLET ORAL 2 TIMES DAILY
Qty: 60 TAB | Refills: 0 | Status: SHIPPED | OUTPATIENT
Start: 2021-05-12 | End: 2021-06-11

## 2021-05-12 RX ORDER — HYDROCODONE BITARTRATE AND ACETAMINOPHEN 5; 325 MG/1; MG/1
1-2 TABLET ORAL
Qty: 42 TAB | Refills: 0 | Status: SHIPPED | OUTPATIENT
Start: 2021-05-12 | End: 2021-05-19

## 2021-05-12 RX ADMIN — POLYETHYLENE GLYCOL 3350 17 G: 17 POWDER, FOR SOLUTION ORAL at 08:50

## 2021-05-12 RX ADMIN — ASPIRIN 81 MG: 81 TABLET, COATED ORAL at 08:51

## 2021-05-12 RX ADMIN — DOCUSATE SODIUM 50MG AND SENNOSIDES 8.6MG 1 TABLET: 8.6; 5 TABLET, FILM COATED ORAL at 08:51

## 2021-05-12 RX ADMIN — CEFAZOLIN 2 G: 1 INJECTION, POWDER, FOR SOLUTION INTRAMUSCULAR; INTRAVENOUS at 06:30

## 2021-05-12 RX ADMIN — HYDROCODONE BITARTRATE AND ACETAMINOPHEN 1 TABLET: 5; 325 TABLET ORAL at 02:31

## 2021-05-12 RX ADMIN — HYDROCODONE BITARTRATE AND ACETAMINOPHEN 1 TABLET: 5; 325 TABLET ORAL at 11:10

## 2021-05-12 RX ADMIN — KETOROLAC TROMETHAMINE 15 MG: 30 INJECTION, SOLUTION INTRAMUSCULAR; INTRAVENOUS at 11:09

## 2021-05-12 RX ADMIN — DEXAMETHASONE SODIUM PHOSPHATE 4 MG: 4 INJECTION, SOLUTION INTRAMUSCULAR; INTRAVENOUS at 06:30

## 2021-05-12 RX ADMIN — Medication 10 ML: at 06:30

## 2021-05-12 RX ADMIN — HYDROCODONE BITARTRATE AND ACETAMINOPHEN 1 TABLET: 5; 325 TABLET ORAL at 06:30

## 2021-05-12 RX ADMIN — KETOROLAC TROMETHAMINE 15 MG: 30 INJECTION, SOLUTION INTRAMUSCULAR; INTRAVENOUS at 06:30

## 2021-05-12 NOTE — PROGRESS NOTES
OCCUPATIONAL THERAPY EVALUATION/DISCHARGE  Patient: Nuzhat Ignacio (63 y.o. female)  Date: 5/12/2021  Primary Diagnosis: Primary osteoarthritis of left hip [M16.12]  Procedure(s) (LRB):  LEFT TOTAL HIP ARTHROPLASTY WITH ANTERIOR APPROACH (SPINAL ) (Left) 1 Day Post-Op   Precautions:  WBAT, Fall, Total hip(no extremes of motion on operated hip)    ASSESSMENT  Based on the objective data described below, the patient presents with good safety awareness, no LOB and able to reach feet to perform ADLs POD 1 L NORY anterior approach. She will have good support from daughter and grandson during day and significant other at night. No further skilled OT required at this time. Current Level of Function (ADLs/self-care): supervision and set-up, ethan ASTUDILLO    Functional Outcome Measure: The patient scored 70/100 on the Barthel Index outcome measure. Other factors to consider for discharge: none     PLAN :  Recommendation for discharge: (in order for the patient to meet his/her long term goals)  No skilled occupational therapy/ follow up rehabilitation needs identified at this time. This discharge recommendation:  Has not yet been discussed the attending provider and/or case management    IF patient discharges home will need the following DME: reacher       SUBJECTIVE:   Patient stated I feel pretty good.     OBJECTIVE DATA SUMMARY:   HISTORY:   Past Medical History:   Diagnosis Date    Adverse effect of anesthesia     Dentist told patient never to take any anesthesia with a vasoconstricter    Asthma     COVID-19 vaccine series completed     Pf[employer]    Fibrocystic breast 10/29/2014    Hypertension     Kidney stones     MRSA nasal colonization 05/05/2021    PUD (peptic ulcer disease)     Seasonal allergic rhinitis      Past Surgical History:   Procedure Laterality Date    HX BREAST BIOPSY Right 2000 & 2007    HX CARPAL TUNNEL RELEASE Bilateral     HX CERVICAL FUSION  2017    HX GYN  1999    hysterectomy  HX HEENT      wisdom teeth    HX ORTHOPAEDIC  01/1996    neck - C 5,6 fused    HX ORTHOPAEDIC  06/2017    spinal fusion    SUSANNAH US BX BREAST RT VAC ASST Right 2005 2005 US right breast biopsy; benign    NC BREAST SURGERY PROCEDURE UNLISTED  4/20/00    Monica Truong: excision rt br cystic material    NC BREAST SURGERY PROCEDURE UNLISTED  2/22/07    Monica Truong: rt br biopsy w/ wire loc       Prior Level of Function/Environment/Context: Independent, active, drives   Expanded or extensive additional review of patient history:     Home Situation  Home Environment: Private residence  # Steps to Enter: 4  Rails to Enter: Yes  Hand Rails : Bilateral  One/Two Story Residence: Two story, live on 1st floor  Living Alone: No  Support Systems: Child(willian), Spouse/Significant Other/Partner(significant other will stay at night; daughter nextdoor)  Patient Expects to be Discharged to[de-identified] Private residence  Current DME Used/Available at Home: Cane, straight, Commode, bedside, Blood pressure cuff, Adaptive dressing aides, Shower chair, Grab bars, Walker, rolling  Tub or Shower Type: Shower    Hand dominance: Right    EXAMINATION OF PERFORMANCE DEFICITS:  Cognitive/Behavioral Status:  Neurologic State: Alert  Orientation Level: Oriented X4  Cognition: Follows commands  Perception: Appears intact  Perseveration: No perseveration noted  Safety/Judgement: Awareness of environment;Driving appropriateness; Fall prevention;Good awareness of safety precautions; Home safety; Insight into deficits    Hearing: Auditory  Auditory Impairment: None    Vision/Perceptual:    Acuity: Within Defined Limits    Corrective Lenses: Glasses    Range of Motion:  AROM: Within functional limits                         Strength:  Strength: Within functional limits                Coordination:     Fine Motor Skills-Upper: Left Intact; Right Intact    Gross Motor Skills-Upper: Left Intact; Right Intact    Tone & Sensation:  Tone: Normal  Sensation: Intact Balance:  Sitting: Intact  Standing: Intact; With support    Functional Mobility and Transfers for ADLs:  Bed Mobility:  Supine to Sit: Supervision  Scooting: Supervision    Transfers:  Sit to Stand: Stand-by assistance  Stand to Sit: Stand-by assistance  Bed to Chair: Stand-by assistance  Bathroom Mobility: Supervision/set up  Toilet Transfer : Supervision  Assistive Device : Gait Belt;Walker, rolling    ADL Assessment:  The patient recalled and demonstrated hip contraindications Left LE during ADLs and functional mobility with verbal cues. Feeding: Independent    Oral Facial Hygiene/Grooming: Supervision(standing at sink)    Bathing: Supervision;Setup; Additional time(seated and bending forward to reach feet)    Upper Body Dressing: Setup    Lower Body Dressing: Setup;Supervision; Additional time(seated and bending forward to reach feet)    Toileting: Supervision                ADL Intervention and task modifications:  Cognitive Retraining  Safety/Judgement: Awareness of environment;Driving appropriateness; Fall prevention;Good awareness of safety precautions; Home safety; Insight into deficits    Bathing: Patient instructed when bathing to not submerge wound in water, stand to shower or sponge bathe, cover wound with plastic and tape to ensure no water reaches bandage/wound without cues. Patient indicated understanding. Dressing joint: Patient instructed and demonstrated to don/doff Left LE first/last verbal cues. Patient instructed and demonstrated to don all clothing while sitting prior to standing, doff all clothing to knees while standing, then sit to doff clothing off from knees to feet in order to facilitate fall prevention, pain management, and energy conservation with Supervision.     Home safety: Patient instructed on home modifications and safety (raise height of ADL objects, appropriate height of chair surfaces, recliner safety, change of floor surfaces, clear pathways) to increase independence and fall prevention. Patient indicated understanding. Standing: Patient instructed and demonstrated to walk up to sink/counter top/surfaces, step into walker to increase safety of joint and fall prevention with Supervision. Instructed to apply concept of hip contraindications to ADLs within the home (no extreme reaching across body to Left side, square off while using objects, slide objects along surfaces). Patient instructed to increase amount of time standing, observe standing position during ADLs in order to increase even weight bearing through bilateral LEs in order to increase independence with ADLs. Goal to be reached 30 days post - op, per orthopedic surgeon or per PT. Patient indicated understanding. Tub transfer: Patient instructed regarding when it is safe to begin transfer into tub (complete stairs with PT, advance exercises with PT high enough to clear tub height). Patient instructed to use the same technique as used with stairs when entering and exiting tub (\"up with the non-surgical, down with the surgical leg\"). Patient indicated understanding. Functional Measure:  Barthel Index:    Bathin  Bladder: 10  Bowels: 10  Groomin  Dressin  Feeding: 10  Mobility: 10  Stairs: 5  Toilet Use: 5  Transfer (Bed to Chair and Back): 10  Total: 70/100        The Barthel ADL Index: Guidelines  1. The index should be used as a record of what a patient does, not as a record of what a patient could do. 2. The main aim is to establish degree of independence from any help, physical or verbal, however minor and for whatever reason. 3. The need for supervision renders the patient not independent. 4. A patient's performance should be established using the best available evidence. Asking the patient, friends/relatives and nurses are the usual sources, but direct observation and common sense are also important. However direct testing is not needed.   5. Usually the patient's performance over the preceding 24-48 hours is important, but occasionally longer periods will be relevant. 6. Middle categories imply that the patient supplies over 50 per cent of the effort. 7. Use of aids to be independent is allowed. Atilio Gee., Barthel, D.W. (0301). Functional evaluation: the Barthel Index. [address] [facility] (14)2. GREGG Larsen, Amarilis Roland., Diego Barriga., Dix, [address] (). Measuring the change indisability after inpatient rehabilitation; comparison of the responsiveness of the Barthel Index and Functional [place] Measure. Journal of Neurology, Neurosurgery, and Psychiatry, 66(4), 949-256. Bryon Gupta, N.J.A, GENE Tabor, & Estevan Kasper M.A. (2004.) Assessment of post-stroke quality of life in cost-effectiveness studies: The usefulness of the Barthel Index and the EuroQoL-5D. Quality of Life Research, 15, 410-30       Occupational Therapy Evaluation Charge Determination   History Examination Decision-Making   LOW Complexity : Brief history review  LOW Complexity : 1-3 performance deficits relating to physical, cognitive , or psychosocial skils that result in activity limitations and / or participation restrictions  LOW Complexity : No comorbidities that affect functional and no verbal or physical assistance needed to complete eval tasks       Based on the above components, the patient evaluation is determined to be of the following complexity level: LOW   Pain Ratin/10    Activity Tolerance:   Good      After treatment patient left in no apparent distress:    Sitting in chair and Call bell within reach    COMMUNICATION/EDUCATION:   The patients plan of care was discussed with: Physical therapy assistant and Registered nurse.      Thank you for this referral.  Maycol Salazar, OT  Time Calculation: 22 mins

## 2021-05-12 NOTE — PROGRESS NOTES
1345: Discussed discharge instructions and summary with patient. Patient verbalized understanding of instructions and medications. Patient aware that prescriptions were sent electronically to patient's usual pharmacy. Patient signed discharge paperwork via e-sign. Opportunity for questions/clarification provided. VSS. IV removed with no complication. Pt took all belongings with them. Problem: Falls - Risk of  Goal: *Absence of Falls  Description: Document Rufina Carter Fall Risk and appropriate interventions in the flowsheet.   Outcome: Progressing Towards Goal  Note: Fall Risk Interventions:  Mobility Interventions: Bed/chair exit alarm, Patient to call before getting OOB    Medication Interventions: Patient to call before getting OOB, Teach patient to arise slowly    Elimination Interventions: Call light in reach, Bed/chair exit alarm    Problem: Patient Education: Go to Patient Education Activity  Goal: Patient/Family Education  Outcome: Progressing Towards Goal

## 2021-05-12 NOTE — PROGRESS NOTES
TOTAL HIP ARTHROPLASTY DAILY NOTE    POD  1 Day Post-Op s/p Procedure(s):  LEFT TOTAL HIP ARTHROPLASTY WITH ANTERIOR APPROACH (SPINAL )     ASSESSMENT / PLAN :   1. Pain Control : Excellent - Able to sleep and participate with therapy  2. Wound or incisional issue : Healing incision with no visible drainage  3. Therapy / Weight Bearing Recommendations : Weight bear as tolerated with use of a walker and two person assist while mobilizing  4. DVT Prophylaxis :  Aspirin and mechanical lower extremity compression device  5. Disposition : Discharge today pending hospital and PT criteria for discharge. SUBJECTIVE :     KALPANA overnight. Pain controlled. Working with PT. Voiding without difficulty. Tolerating a regular diet. Denies CP/SOB/Abd pain/or other complaints. OBJECTIVE :     Vitals:    05/11/21 1730 05/11/21 2002 05/11/21 2338 05/12/21 0333   BP: 111/69 121/73 115/71 110/67   Pulse: 78 82 78 76   Resp: 16 15 16 16   Temp: 97.9 °F (36.6 °C) 97.3 °F (36.3 °C) 97.9 °F (36.6 °C) 98.1 °F (36.7 °C)   SpO2: 95% 95% 94% 97%   Weight:       Height:           Alert and oriented x3. left exam of the hip reveals that the dressing is clean, dry and intact. The patient has + quadriceps, ankle DF/PF, EHL/FHL  Sensation is intact to light touch distally  No calf pain. Labs:  No results for input(s): HGB, HCT, INR, NA, K, CL, CO2, BUN, CREA, GLU, HGBEXT, HCTEXT, INREXT in the last 72 hours. Patient mobility  Gait  Gait Abnormalities: Step to gait  Ambulation - Level of Assistance: Assist x2, Minimal assistance  Distance (ft): 8 Feet (ft)  Assistive Device: Gait belt, Walker, rolling              Marquis Sainz [place], [place]  Supervising Physician: Dr. Izquierdo Diss.  [address] [place] [address] [phone]  Medical Staff : Elena Malik  Office : [phone]

## 2021-05-12 NOTE — PROGRESS NOTES
5/12/2021  9:13 AM  Reason for Admission: Elective - PRIMARY OSTEOARHRITIS OF LEFT HIP requiring LEFT TOTAL HIP ARTHROPLASTY WITH ANTERIOR APPROACH (SPINAL )      Assessment:   [x]In person with pt   []Via p/c with pt   []With family member in person. Who/Relation:     []With family member via p/c. Who/Relation:   []Chart Review    RUR: NA - OBS  Risk Level: [x]Low []Moderate []High  Value-based purchasing: [] Yes [x] No  Bundle patient: [x] Yes [] No   Specify:  Ortho    Advance Directive: No Order. [] No AD on file. [x] AD on file. [] Current AD not on file. Copy requested. [] Requests AD, and referral submitted to [facility]. Healthcare Decision Maker:  ESTHER        Assessment:    Age: 78    Sex: [] Male [x]Female     Residency: [x]Private residence []Apartment []Assisted Living []LTC []Other:   Exterior Steps: 4  Interior Steps: 1 flight    Lives With: []With spouse []Other family members []Underage children [x]Alone []Care provider [x]Other: Pt has arranged for friends and family to stay with her for a couple days. Prior functioning:  [x]Independent with ADLs and iADLS []Dependent with ADLs and iADLs []Partial dependence, Specify:     Prior DME required:  []None []RW [x]Cane []Crutches []Bedside commode []CPAP []Home O2 (Liter/Provider: ) []Nebulizer   []Shower Chair []Wheelchair []Hospital Bed [][place]aldemar []Stair lift []Rollator []Other:    DME available: []None [x]RW [x]Cane []Crutches []Bedside commode []CPAP []Home O2 (Liter/Provider: ) []Nebulizer   []Shower Chair []Wheelchair []Hospital Bed []Waldemar []Stair lift []Rollator []Other:    Rehab history: []None [x]Outpatient PT []Home Health (Provider/Date: ) []SNF (Provider/Date: ) []IPR (Provider/Date: ) []LTC (Provider/Date: ) []Hospice (Provider/Date: )  []Other:     Discharge Concerns: []Yes [x]No []Unknown   Describe:    Comments:      Insurer:   [address] [place] MEDICARE PART A & B Phone: [phone] Subscriber: Sury Boyce Subscriber#: [id number]    Group#:  Precert#:         [employer] [place] Phone:     Subscriber: Sury Boyce Subscriber#: [id number]    Group#:  Precert#:       Observation notice provided in writing to patient and/or caregiver as well as verbal explanation of the policy. Patients who are in outpatient status also receive the Observation notice. Patient has received notice and or patient representative has received via secure email, fax, or certified mail based on patient representative's preference. Pt did not sign. PCP: Juliann Stein   Address: [address] [place] [address] Place / [place] [address]   Phone number: [phone]   Current patient: [x]Yes []No   Approximate date of last visit: 05/03/2021   Access to virtual PCP visits: [x]Yes []No    Pharmacy:  [id number] Transport: Family       Transition of care plan:    []Unable to determine at this time. Awaiting clinical progress, and disposition recommendations. [] Home with outpatient follow-up    [x] Home with Outpatient PT and outpatient follow-up   Pt aware of OP appt? [x]Yes, Provider: Mary Quijano   []Not scheduled   Transport provider: Family/friends    [] Home with family assistance as needed and outpatient follow-up   Family able to assist:    Schedule:  Transport provider:      [] Home with Home Health   - Provider:     []SNF/IPR   -[]Preferences given:   []Listing provided and preferences requested   -Status: []Pending []Accepted:    -Auth required: []Yes []No    -Auth initiated date:   -3 midnight stay required: []Yes []No  Date satisfied:     [] Home with Hospice   -Provider:     [] [facility] information provided. [] Other:     Analilia Hayden MA    Care Management Interventions  PCP Verified by CM: Yes(Jonatan)  Mode of Transport at Discharge:  Other (see comment)(Family)  Transition of Care Consult (CM Consult): Discharge Planning  MyChart Signup: No  Discharge Durable Medical Equipment: No  Physical Therapy Consult: Yes  Occupational Therapy Consult: Yes  Speech Therapy Consult: No  Current Support Network: Lives Alone, Family Lives Nearby  Confirm Follow Up Transport: Family  Discharge Location  Discharge Placement: Home with outpatient services

## 2021-05-12 NOTE — PROGRESS NOTES
Problem: Mobility Impaired (Adult and Pediatric)  Goal: *Acute Goals and Plan of Care (Insert Text)  Description: FUNCTIONAL STATUS PRIOR TO ADMISSION: Patient was independent and active without use of DME.    HOME SUPPORT PRIOR TO ADMISSION: The patient lived with SO but did not require assist.    Physical Therapy Goals  Initiated 5/11/2021    1. Patient will move from supine to sit and sit to supine  in bed with modified independence within 4 days. 2. Patient will perform sit to stand with modified independence within 4 days. 3. Patient will ambulate with modified independence for 100 feet with the least restrictive device within 4 days. 4. Patient will ascend/descend 4 stairs with 1 handrail(s) with minimal assistance/contact guard assist within 4 days. 5. Patient will verbalize and demonstrate understanding of anterior hip precautions per protocol within 4 days. 6. Patient will perform total hip home exercise program per protocol with independence within 4 days. Outcome: Progressing Towards Goal  Note:   PHYSICAL THERAPY TREATMENT  Patient: Cyndie Boyd (21 y.o. female)  Date: 5/12/2021  Diagnosis: Primary osteoarthritis of left hip [M16.12] <principal problem not specified>  Procedure(s) (LRB):  LEFT TOTAL HIP ARTHROPLASTY WITH ANTERIOR APPROACH (SPINAL ) (Left) 1 Day Post-Op  Precautions: WBAT, Fall, Total hip(no extremes of motion on operated hip)  Chart, physical therapy assessment, plan of care and goals were reviewed. ASSESSMENT  Patient continues with skilled PT services and is progressing towards goals. Reports L thigh numbness, no knee instability noted with activity. Progressing toward heel/toe gait pattern with time. BP stable. Reviewed seated and supported standing HEP .  Will attempt stair training this afternoon     Current Level of Function Impacting Discharge (mobility/balance): CGA    Other factors to consider for discharge:          PLAN :  Patient continues to benefit from skilled intervention to address the above impairments. Continue treatment per established plan of care. to address goals. Recommendation for discharge: (in order for the patient to meet his/her long term goals)  Outpatient physical therapy follow up recommended for NORY    This discharge recommendation:  Has been made in collaboration with the attending provider and/or case management    IF patient discharges home will need the following DME: patient owns DME required for discharge       SUBJECTIVE:   Patient stated my leg is still numb.     OBJECTIVE DATA SUMMARY:   Critical Behavior:  Neurologic State: Alert  Orientation Level: Oriented X4  Cognition: Follows commands  Safety/Judgement: Awareness of environment, Driving appropriateness, Fall prevention, Good awareness of safety precautions, Home safety, Insight into deficits  Functional Mobility Training:  Bed Mobility:     Supine to Sit: Supervision     Scooting: Supervision        Transfers:  Sit to Stand: Stand-by assistance  Stand to Sit: Stand-by assistance        Bed to Chair: Stand-by assistance                    Balance:  Sitting: Intact  Standing: Intact; With support  Ambulation/Gait Training:  Distance (ft): 100 Feet (ft)  Assistive Device: Walker, rolling;Gait belt  Ambulation - Level of Assistance: Contact guard assistance;Stand-by assistance        Gait Abnormalities: Step to gait; Antalgic                                      Stairs:               Therapeutic Exercises:   SUPINE  EXERCISES   Sets   Reps   Active Active Assist   Passive Self ROM   Comments   Ankle Pumps   [x]                                        []                                        []                                        []                                           Quad Sets   [x]                                        []                                        []                                        []                                           Heel Slides   [x] []                                        []                                        []                                           Hip Abduction   []                                        []                                        []                                        []                                           Glut Sets   [x]                                        []                                        []                                        []                                              []                                        []                                        []                                        []                                              []                                        []                                        []                                        []                                             STANDING  EXERCISES   Sets   Reps   Active Active Assist   Passive Self ROM   Comments   Heel Raises   [x]                                        []                                        []                                        []                                           Hip Abduction   [x]                                        []                                        []                                        []                                              []                                        []                                        []                                        []                                              []                                        []                                        []                                        []                                             Pain Ratin/10    Activity Tolerance:   Good      After treatment patient left in no apparent distress:   Sitting in chair and Call bell within reach    COMMUNICATION/COLLABORATION:   The patients plan of care was discussed with: Registered nurse. Dimas Gross   Time Calculation: 30 mins

## 2021-05-12 NOTE — PROGRESS NOTES
Problem: Mobility Impaired (Adult and Pediatric)  Goal: *Acute Goals and Plan of Care (Insert Text)  Description: FUNCTIONAL STATUS PRIOR TO ADMISSION: Patient was independent and active without use of DME.    HOME SUPPORT PRIOR TO ADMISSION: The patient lived with SO but did not require assist.    Physical Therapy Goals  Initiated 5/11/2021    1. Patient will move from supine to sit and sit to supine  in bed with modified independence within 4 days. 2. Patient will perform sit to stand with modified independence within 4 days. 3. Patient will ambulate with modified independence for 100 feet with the least restrictive device within 4 days. 4. Patient will ascend/descend 4 stairs with 1 handrail(s) with minimal assistance/contact guard assist within 4 days. 5. Patient will verbalize and demonstrate understanding of anterior hip precautions per protocol within 4 days. 6. Patient will perform total hip home exercise program per protocol with independence within 4 days. 5/12/2021 1236 by Sherley Avina  Outcome: Progressing Towards Goal  Note:   PHYSICAL THERAPY TREATMENT  Patient: Arina Zambrano (22 y.o. female)  Date: 5/12/2021  Diagnosis: Primary osteoarthritis of left hip [M16.12] <principal problem not specified>  Procedure(s) (LRB):  LEFT TOTAL HIP ARTHROPLASTY WITH ANTERIOR APPROACH (SPINAL ) (Left) 1 Day Post-Op  Precautions: WBAT, Fall, Total hip(no extremes of motion on operated hip)  Chart, physical therapy assessment, plan of care and goals were reviewed. ASSESSMENT  Patient continues with skilled PT services and is progressing towards goals. CGA for functional transfers and gait training using RW for steadying. No knee instability noted. Ascend/descend 6 steps using B handrails. Reviewed HEP, hip precautions and car trasnfers. Pt is cleared for discharge from PT [facility].     Current Level of Function Impacting Discharge (mobility/balance): CGA    Other factors to consider for discharge: PLAN :  Patient continues to benefit from skilled intervention to address the above impairments. Continue treatment per established plan of care. to address goals. Recommendation for discharge: (in order for the patient to meet his/her long term goals)  Outpatient physical therapy follow up recommended for NORY    This discharge recommendation:  Has been made in collaboration with the attending provider and/or case management    IF patient discharges home will need the following DME: patient owns DME required for discharge       SUBJECTIVE:   Patient stated doing ok.     OBJECTIVE DATA SUMMARY:   Critical Behavior:  Neurologic State: Alert  Orientation Level: Oriented X4  Cognition: Follows commands  Safety/Judgement: Awareness of environment, Driving appropriateness, Fall prevention, Good awareness of safety precautions, Home safety, Insight into deficits  Functional Mobility Training:  Bed Mobility:     Supine to Sit: Supervision     Scooting: Supervision        Transfers:  Sit to Stand: Stand-by assistance  Stand to Sit: Stand-by assistance        Bed to Chair: Stand-by assistance                    Balance:  Sitting: Intact  Standing: Intact; With support  Ambulation/Gait Training:  Distance (ft): 100 Feet (ft)  Assistive Device: Walker, rolling;Gait belt  Ambulation - Level of Assistance: Contact guard assistance;Stand-by assistance        Gait Abnormalities: Antalgic; Step to gait                                      Stairs:  Number of Stairs Trained: 6  Stairs - Level of Assistance: Contact guard assistance   Rail Use: Both    Therapeutic Exercises:   SUPINE  EXERCISES   Sets   Reps   Active Active Assist   Passive Self ROM   Comments   Ankle Pumps   [x]                                        []                                        []                                        []                                           Quad Sets   [x]                                        [] []                                        []                                           Heel Slides   [x]                                        []                                        []                                        []                                           Hip Abduction   [x]                                        []                                        []                                        []                                           Glut Sets   [x]                                        []                                        []                                        []                                              []                                        []                                        []                                        []                                              []                                        []                                        []                                        []                                             STANDING  EXERCISES   Sets   Reps   Active Active Assist   Passive Self ROM   Comments   Heel Raises   [x]                                        []                                        []                                        []                                           Hip Abduction   [x]                                        []                                        []                                        []                                              []                                        []                                        []                                        []                                              []                                        []                                        []                                        []                                             Pain Rating:  3/10    Activity Tolerance:   Good      After treatment patient left in no apparent distress:   Sitting in chair, Call bell within reach, and Caregiver / family present    COMMUNICATION/COLLABORATION:   The patients plan of care was discussed with: Registered nurseRobb Hernandes   Time Calculation: 29 mins          5/12/2021 0950 by Jessica Green  Outcome: Progressing Towards Goal  Note:   PHYSICAL THERAPY TREATMENT  Patient: Kaitlin Ford (85 y.o. female)  Date: 5/12/2021  Diagnosis: Primary osteoarthritis of left hip [M16.12] <principal problem not specified>  Procedure(s) (LRB):  LEFT TOTAL HIP ARTHROPLASTY WITH ANTERIOR APPROACH (SPINAL ) (Left) 1 Day Post-Op  Precautions: WBAT, Fall, Total hip(no extremes of motion on operated hip)  Chart, physical therapy assessment, plan of care and goals were reviewed. ASSESSMENT  Patient continues with skilled PT services and is progressing towards goals. Reports L thigh numbness, no knee instability noted with activity. Progressing toward heel/toe gait pattern with time. BP stable. Reviewed seated and supported standing HEP . Will attempt stair training this afternoon     Current Level of Function Impacting Discharge (mobility/balance): CGA    Other factors to consider for discharge:          PLAN :  Patient continues to benefit from skilled intervention to address the above impairments. Continue treatment per established plan of care. to address goals. Recommendation for discharge: (in order for the patient to meet his/her long term goals)  Outpatient physical therapy follow up recommended for NORY    This discharge recommendation:  Has been made in collaboration with the attending provider and/or case management    IF patient discharges home will need the following DME: patient owns DME required for discharge       SUBJECTIVE:   Patient stated my leg is still numb.     OBJECTIVE DATA SUMMARY:   Critical Behavior:  Neurologic State: Alert  Orientation Level: Oriented X4  Cognition: Follows commands  Safety/Judgement: Awareness of environment, Driving appropriateness, Fall prevention, Good awareness of safety precautions, Home safety, Insight into deficits  Functional Mobility Training:  Bed Mobility:     Supine to Sit: Supervision     Scooting: Supervision        Transfers:  Sit to Stand: Stand-by assistance  Stand to Sit: Stand-by assistance        Bed to Chair: Stand-by assistance                    Balance:  Sitting: Intact  Standing: Intact; With support  Ambulation/Gait Training:  Distance (ft): 100 Feet (ft)  Assistive Device: Walker, rolling;Gait belt  Ambulation - Level of Assistance: Contact guard assistance;Stand-by assistance        Gait Abnormalities: Step to gait; Antalgic                                      Stairs:               Therapeutic Exercises:   SUPINE  EXERCISES   Sets   Reps   Active Active Assist   Passive Self ROM   Comments   Ankle Pumps   [x]                                        []                                        []                                        []                                           Quad Sets   [x]                                        []                                        []                                        []                                           Heel Slides   [x]                                        []                                        []                                        []                                           Hip Abduction   []                                        []                                        []                                        []                                           Glut Sets   [x]                                        []                                        []                                        []                                              []                                        []                                        []                                        []                                              [] []                                        []                                        []                                             STANDING  EXERCISES   Sets   Reps   Active Active Assist   Passive Self ROM   Comments   Heel Raises   [x]                                        []                                        []                                        []                                           Hip Abduction   [x]                                        []                                        []                                        []                                              []                                        []                                        []                                        []                                              []                                        []                                        []                                        []                                             Pain Ratin/10    Activity Tolerance:   Good      After treatment patient left in no apparent distress:   Sitting in chair and Call bell within reach    COMMUNICATION/COLLABORATION:   The patients plan of care was discussed with: Registered nurse.      Dimas Gross   Time Calculation: 30 mins

## 2021-05-12 NOTE — DISCHARGE INSTRUCTIONS
TOTAL JOINT REPLACEMENT POST OPERATIVE INSTRUCTIONS   Follow-Up Appointment:  o You should already be scheduled for a 2-week follow-up appointment, but if you are unsure about your follow-up date, please call our office at [phone]. o If you do not have this appointment, it should be scheduled 2 weeks from the date of your surgery     Activity:  o Unless you have been specifically instructed otherwise, you can advance your activity as tolerated.   o You have no hip precautions. Be sure any physical therapist who is working with you understands this and encourage them to call my office with any questions.   o You may also bear weight fully on your hip with a walker and transition to a cane or crutch as soon as you feel comfortable and strong enough. That being said, advance your activity in a stepwise and cautious manner. You will likely feel better than your hip is ready for.    o Please refrain from any high level activities until we see you back at your follow up appointment. This includes golfing, exercising, and other more intense activities. o Perform your exercises as often as possible, at least 2 times a day.  o Application of the ice packs will prevent and treat inflammation and reduce pain and swelling. You should ice the incisional area at least 3 times a day, 20-30 minutes at a time.  Dressings / Wound Care:  o Your waterproof dressing should be removed between 7-10 days from surgery. You can do this yourself or your home therapy personnel can do it for you.  o Dermabond (skin glue) may be used to help close the incision, which appears as a thin, transparent covering over the incision. Do not pick or pull at this covering, this will fall off naturally within 2-3 weeks. o Do not apply antibiotic ointment to your incisions. o Once your waterproof dressing has been removed, you may change bandages daily if you like or leave open to air.   These can be purchased at your local pharmacy. o Most incisions are closed with absorbable sutures but if not the sutures or staples will be removed at your 2 week follow up.  Showering / Bathing:  o If your incision is dry without drainage you may shower following your discharge home. The dressing is waterproof as long as well affixed to skin.  o You may shower with the waterproof dressing in place, but dry dressings should be removed before showering.   o It is fine to have water run over the incision after the waterproof dressing has been removed. o Do not vigorously scrub your incision. o Do not take a bath or get into a swimming pool / [employer] until you follow up with Dr. Erwin Massey. o Do not soak your incision under water for 6 weeks. o If there is continued drainage or you are concerned contact Dr. Bessie Becerril office prior to showering [phone] ext. [phone]     Diet:  o You may advance to your regular diet as tolerated. o Proper nutrition is crucial to healing. Make sure you are eating as healthy as possible and following a balanced diet after your surgery. o Avoid processed foods and eat mainly fruits and vegetables.  Medications:  o It is our goal to keep you as comfortable as possible after your surgery. Please take your medications as prescribed without exceeding the recommended dosage. o It is also important to understand that pain has a cycle. It begins and increases until medication interrupts it. The aim of good pain control is to stop the pain before it becomes intolerable. The key is to stay ahead of the pain.  o We encourage patients to discontinue pain medications as soon as possible after surgery. o The side effects of these medications can be substantial and the narcotic medications are not mandatory. You may substitute a prescribed narcotic with over-the-counter Tylenol. The narcotic you have been prescribed (Norco) has Tylenol in it. Each tablet contains 325mg.  Do not exceed 4000mg of Tylenol per 24 hour period. Do not take Norco along with Tylenol-these may be alternated. o Pain medications may cause constipation- Colace twice daily and Miralax while taking the narcotic medication should help prevent constipation. o Other possible side effects of pain medication include dizziness, headache, nausea, vomiting, and urinary retention. o Discontinue the pain medication if you develop itching, rash, shortness of breath, or difficulties swallowing. If these symptoms become severe or are not relieved by discontinuing the medication, you should seek immediate medical attention. o Refills of pain medication are authorized during office hours only (8 AM- 5 PM  Monday through Friday). Our office will not prescribe narcotics beyond 6 weeks from the date of your surgery. o Narcotics will not be called into the pharmacy and, in most cases, you must be seen clinically.  Blood Thinner:  o You will be given a prescription for either Aspirin or Xarelto based on your health history.  o You should take these medications as prescribed for 30 days following your surgery and then an 81mg for 2 additional months if you dont already take one.  Driving:  o You should not return to driving until you are off all narcotic pain medications and able to safely and quickly apply the brakes. This is normally 2-4 weeks for left sided joint replacements and 2-6 weeks for right-sided joint replacements.  Airports and metal detectors  o ID cards used to be routinely given after joint replacement, however they do not save you any time and arent helpful to TSA or security. Most joint replacements do not set off metal detectors. If the detector is set off, just tell the  you have a joint replacement.  Signs/Symptoms of Concern: Contact Dr. Cholo Kelley office if any of the following signs or symptoms develop.  Please be advised if a problem arises which you feel requires immediate medical attention you should seek medical attention at the closest ER. o Temperature greater than 101.5 for more than 8 hrs  o Fever and/or chills that persist for greater than 8 hr.  o A sudden increase in pain/swelling/ or tenderness in the back of your calf or thigh that isnt relieved with ice/elevation and pain medication. o Increased drainage from your incision, increased redness or warmth at the area of your incision or a sudden increase in swelling or redness that persists despite ice and elevation     Feedback  o I want to provide the very best care for you. Feel free to email me comments and suggestions on how I can improve the experience for you and future patients. I will be here with you every step of the way. It is my privilege to be your surgeon. Email: [email or web address] or Website: [email or web address]  SPECIAL TOTAL HIP INSTRUCTIONS    1. Avoid extremes of motion. 2. Avoid planting your operative foot and rotating (turning) at the hip. 3. Avoid low seats, recliners, and bleachers for the first 6-8 weeks    4. You may walk as tolerated and are encouraged to work daily on progressing your activities, with a walker initially. 5. You may transition to a cane for walking 5-7 days from surgery once you feel safe. You may use a walker for longer periods if you feel unstable. 6. If there is wound drainage, hold physical therapy, avoid vigorous activities, and contact our office [phone] ext. [phone].

## 2021-05-14 ENCOUNTER — TELEPHONE (OUTPATIENT)
Dept: MEDSURG UNIT | Age: 71
End: 2021-05-14

## 2021-05-14 NOTE — TELEPHONE ENCOUNTER
Post Discharge Phone placed to patient after Joint Replacement surgery   Spoke with patient    Patient is taking some narcotics, will change to tylenol only tomorrow  States pain is tolerable and pain medication is effective.    Patient was able to fill prescriptions, no medication questions    States discharge instructions were clear and easy to understand has no questions  Patient is using a walker without difficulty, moving every hour  Able to do exercises regularly  Bruising is moderate  Swelling is moderate  Patient is elevating leg and using ice with good relief  Education r/t positioning provided  States dressing is intact without drainage  Denies N/V, appetite is good  Constipation is none, +BM  Follow up appointment is scheduled with surgeon   PT is scheduled Tuesday  Denies fever, cough, chest pain, SOB  Denies any unusual symptoms  Discussed calling surgeon or PCP for concerns  Reminded patient to fill out survey  Opportunity given for patient to ask questions

## 2022-03-19 PROBLEM — M16.12 PRIMARY OSTEOARTHRITIS OF LEFT HIP: Status: ACTIVE | Noted: 2021-05-11

## 2022-03-19 PROBLEM — M48.02 CERVICAL STENOSIS OF SPINE: Status: ACTIVE | Noted: 2017-06-06

## 2023-05-10 RX ORDER — FLUTICASONE PROPIONATE 50 MCG
2 SPRAY, SUSPENSION (ML) NASAL 2 TIMES DAILY PRN
COMMUNITY

## 2023-05-10 RX ORDER — FLUTICASONE PROPIONATE AND SALMETEROL XINAFOATE 115; 21 UG/1; UG/1
2 AEROSOL, METERED RESPIRATORY (INHALATION)
COMMUNITY

## 2023-05-10 RX ORDER — LOSARTAN POTASSIUM 100 MG/1
100 TABLET ORAL DAILY
COMMUNITY

## 2023-05-10 RX ORDER — ROSUVASTATIN CALCIUM 5 MG/1
5 TABLET, COATED ORAL NIGHTLY
COMMUNITY

## 2023-05-10 RX ORDER — DICLOFENAC SODIUM 75 MG/1
TABLET, DELAYED RELEASE ORAL 2 TIMES DAILY
COMMUNITY

## 2023-05-10 RX ORDER — HYDROCHLOROTHIAZIDE 25 MG/1
TABLET ORAL EVERY MORNING
COMMUNITY

## 2023-11-29 ENCOUNTER — HOSPITAL ENCOUNTER (OUTPATIENT)
Facility: HOSPITAL | Age: 73
Discharge: HOME | End: 2023-12-02
Payer: MEDICARE

## 2023-11-29 ENCOUNTER — HOSPITAL ENCOUNTER (OUTPATIENT)
Facility: HOSPITAL | Age: 73
Discharge: HOME | End: 2023-12-02
Attending: OBSTETRICS & GYNECOLOGY
Payer: MEDICARE

## 2023-11-29 VITALS — BODY MASS INDEX: 32.87 KG/M2 | HEIGHT: 64 IN

## 2023-11-29 DIAGNOSIS — Z78.0 POST-MENOPAUSE: ICD-10-CM

## 2023-11-29 DIAGNOSIS — R92.8 ABNORMAL MAMMOGRAM: ICD-10-CM

## 2023-11-29 DIAGNOSIS — R92.8 OTHER ABNORMAL AND INCONCLUSIVE FINDINGS ON DIAGNOSTIC IMAGING OF BREAST: ICD-10-CM

## 2023-11-29 PROCEDURE — G0279 TOMOSYNTHESIS, MAMMO: HCPCS

## 2023-11-29 PROCEDURE — 77080 DXA BONE DENSITY AXIAL: CPT

## 2023-11-29 PROCEDURE — 76642 ULTRASOUND BREAST LIMITED: CPT

## 2024-09-26 ENCOUNTER — TRANSCRIBE ORDERS (OUTPATIENT)
Facility: HOSPITAL | Age: 74
End: 2024-09-26

## 2024-09-26 DIAGNOSIS — Z12.31 ENCOUNTER FOR SCREENING MAMMOGRAM FOR MALIGNANT NEOPLASM OF BREAST: Primary | ICD-10-CM

## 2024-10-22 ENCOUNTER — HOSPITAL ENCOUNTER (OUTPATIENT)
Facility: HOSPITAL | Age: 74
Discharge: HOME | End: 2024-10-25
Payer: MEDICARE

## 2024-10-22 VITALS — BODY MASS INDEX: 32.61 KG/M2 | WEIGHT: 191 LBS | HEIGHT: 64 IN

## 2024-10-22 DIAGNOSIS — Z12.31 ENCOUNTER FOR SCREENING MAMMOGRAM FOR MALIGNANT NEOPLASM OF BREAST: ICD-10-CM

## 2024-10-22 PROCEDURE — 77063 BREAST TOMOSYNTHESIS BI: CPT

## 2024-11-06 ENCOUNTER — HOSPITAL ENCOUNTER (OUTPATIENT)
Facility: HOSPITAL | Age: 74
Discharge: HOME | End: 2024-11-09
Payer: MEDICARE

## 2024-11-06 DIAGNOSIS — R92.8 ABNORMAL MAMMOGRAM: ICD-10-CM

## 2024-11-06 PROCEDURE — 76642 ULTRASOUND BREAST LIMITED: CPT

## 2024-11-06 PROCEDURE — G0279 TOMOSYNTHESIS, MAMMO: HCPCS

## (undated) DEVICE — SUTURE VCRL SZ 1 L36IN ABSRB UD L36MM CT-1 1/2 CIR J947H

## (undated) DEVICE — COVER LT HNDL BLU PLAS

## (undated) DEVICE — SUTURE STRATAFIX SYMMETRIC SZ 1 L18IN ABSRB VLT CT1 L36CM SXPP1A404

## (undated) DEVICE — ROCKER SWITCH PENCIL BLADE ELECTRODE, HOLSTER: Brand: EDGE

## (undated) DEVICE — TRAY PREP DRY W/ PREM GLV 2 APPL 6 SPNG 2 UNDPD 1 OVERWRAP

## (undated) DEVICE — STERILE POLYISOPRENE POWDER-FREE SURGICAL GLOVES: Brand: PROTEXIS

## (undated) DEVICE — SUTURE STRATAFIX SPRL MCRYL + SZ 3-0 L24IN ABSRB UD PS-2 SXMP1B108

## (undated) DEVICE — REM POLYHESIVE ADULT PATIENT RETURN ELECTRODE: Brand: VALLEYLAB

## (undated) DEVICE — 6619 2 PTNT ISO SYS INCISE AREA&LT;(&GT;&&LT;)&GT;P: Brand: STERI-DRAPE™ IOBAN™ 2

## (undated) DEVICE — SOL IRR SOD CL 0.9% 1000ML BTL --

## (undated) DEVICE — COVER LT HNDL PLAS RIG 1 PER PK

## (undated) DEVICE — 3M™ IOBAN™ 2 ANTIMICROBIAL INCISE DRAPE 6650EZ: Brand: IOBAN™ 2

## (undated) DEVICE — BLADE ELECTRODE: Brand: EDGE

## (undated) DEVICE — SCREW EXT FIX L12MM FOR DISTRCTN

## (undated) DEVICE — SYR LR LCK 1ML GRAD NSAF 30ML --

## (undated) DEVICE — YANKAUER,OPEN TIP,W/O VENT,STERILE: Brand: MEDLINE INDUSTRIES, INC.

## (undated) DEVICE — NEEDLE BX 11GA L101MM BNE MAR ASPIR W/ ADPT JAMSH

## (undated) DEVICE — GOWN,SIRUS,NONRNF,SETINSLV,2XL,18/CS: Brand: MEDLINE

## (undated) DEVICE — SYR 50ML LR LCK 1ML GRAD NSAF --

## (undated) DEVICE — STERILE POLYISOPRENE POWDER-FREE SURGICAL GLOVES WITH EMOLLIENT COATING: Brand: PROTEXIS

## (undated) DEVICE — PREP KIT PEEL PTCH POVIDONE IOD

## (undated) DEVICE — (D)PREP SKN CHLRAPRP APPL 26ML -- CONVERT TO ITEM 371833

## (undated) DEVICE — DRAPE,LAPAROTOMY,PED,STERILE: Brand: MEDLINE

## (undated) DEVICE — 3M™ DURAPORE™ SURGICAL TAPE 1538-3, 3 INCH X 10 YARD (7,5CM X 9,1M), 4 ROLLS/BOX: Brand: 3M™ DURAPORE™

## (undated) DEVICE — TRAY CATH OD16FR SIL URIN M STATLOK STBL DEV SURSTP

## (undated) DEVICE — BASIC PACK: Brand: CONVERTORS

## (undated) DEVICE — NEEDLE HYPO 18GA L1.5IN PNK S STL HUB POLYPR SHLD REG BVL

## (undated) DEVICE — 3000CC GUARDIAN II: Brand: GUARDIAN

## (undated) DEVICE — SUTURE MCRYL SZ 4-0 L27IN ABSRB UD L19MM PS-2 1/2 CIR PRIM Y426H

## (undated) DEVICE — INTENDED FOR TISSUE SEPARATION, AND OTHER PROCEDURES THAT REQUIRE A SHARP SURGICAL BLADE TO PUNCTURE OR CUT.: Brand: BARD-PARKER ® CARBON RIB-BACK BLADES

## (undated) DEVICE — 3M™ STERI-DRAPE™ U-DRAPE 1015: Brand: STERI-DRAPE™

## (undated) DEVICE — SLIM BODY SKIN STAPLER: Brand: APPOSE ULC

## (undated) DEVICE — SOLUTION IV 1000ML 0.9% SOD CHL

## (undated) DEVICE — (D)STRIP SKN CLSR 0.5X4IN WHT --

## (undated) DEVICE — SPONGE: SPECIALTY PEANUT XR 100/CS: Brand: MEDICAL ACTION INDUSTRIES

## (undated) DEVICE — OVERLAY MATRS H2IN FOAM CONVOLUTED STD DENS

## (undated) DEVICE — COVER,MAYO STAND,STERILE: Brand: MEDLINE

## (undated) DEVICE — DRAPE MICSCP XLN W48XL118IN 2 BRDG STEREO OBS ZEISS

## (undated) DEVICE — BIPOLAR FORCEPS CORD,BANANA LEADS: Brand: VALLEYLAB

## (undated) DEVICE — SOLUTION IRRIG 1000ML H2O STRL BLT

## (undated) DEVICE — INSULATED BLADE ELECTRODE: Brand: EDGE

## (undated) DEVICE — 4-PORT MANIFOLD: Brand: NEPTUNE 2

## (undated) DEVICE — 1010 S-DRAPE TOWEL DRAPE 10/BX: Brand: STERI-DRAPE™

## (undated) DEVICE — PACKING 8004000 NEURAY 200PK 13X13MM: Brand: NEURAY ®

## (undated) DEVICE — TOWEL SURG W17XL27IN STD BLU COT NONFENESTRATED PREWASHED

## (undated) DEVICE — SHEET, DRAPE, SPLIT, STERILE: Brand: MEDLINE

## (undated) DEVICE — DRAPE,REIN 53X77,STERILE: Brand: MEDLINE

## (undated) DEVICE — SPONGE GZ W4XL4IN COT 12 PLY TYP VII WVN C FLD DSGN

## (undated) DEVICE — STRAP,POSITIONING,KNEE/BODY,FOAM,4X60": Brand: MEDLINE

## (undated) DEVICE — BONE WAX WHITE: Brand: BONE WAX WHITE

## (undated) DEVICE — (D)SYR 10ML 1/5ML GRAD NSAF -- PKGING CHANGE USE ITEM 338027

## (undated) DEVICE — DRAPE,UTILITY,TAPE,15X26,STERILE: Brand: MEDLINE

## (undated) DEVICE — C-ARM: Brand: UNBRANDED

## (undated) DEVICE — Z DUP USE 2275493 DRESSING ALGINATE POST OPERATIVE 10X3.5 IN RECT PRIMASEAL

## (undated) DEVICE — HOOD: Brand: FLYTE

## (undated) DEVICE — PREP SKN CHLRAPRP APL 26ML STR --

## (undated) DEVICE — SOL IRR STRL H2O 1000ML BTL --

## (undated) DEVICE — CATHETER IV 14GA L1.25IN TEF STR HUB INTROCAN SFTY

## (undated) DEVICE — KIT POS FOAM HANA TBL

## (undated) DEVICE — BIT DRL STOP 2.3X14MM DISP --

## (undated) DEVICE — STRYKER PERFORMANCE SERIES SAGITTAL BLADE: Brand: STRYKER PERFORMANCE SERIES

## (undated) DEVICE — DRSG AQUACEL SURG 3.5 X 10IN -- CONVERT TO ITEM 370183

## (undated) DEVICE — Device

## (undated) DEVICE — SUTURE VCRL SZ 3-0 L27IN ABSRB UD L26MM SH 1/2 CIR J416H

## (undated) DEVICE — INFECTION CONTROL KIT SYS

## (undated) DEVICE — TRAP SUC MUCOUS 70ML -- MEDICHOICE MEDLINE

## (undated) DEVICE — DRAPE XR C ARM 41X74IN LF --

## (undated) DEVICE — SURGICAL PROCEDURE PACK BASIN MAJ SET CUST NO CAUT

## (undated) DEVICE — NDL PRT INJ NSAF BLNT 18GX1.5 --

## (undated) DEVICE — SUTURE VCRL SZ 2-0 L36IN ABSRB UD L36MM CT-1 1/2 CIR J945H

## (undated) DEVICE — MAGNETIC DRAPE: Brand: DEVON

## (undated) DEVICE — DECANTER BAG 9": Brand: MEDLINE INDUSTRIES, INC.

## (undated) DEVICE — Z CONVERTED USE 2271043 CONTAINER SPEC COLL 4OZ SCR ON LID PEEL PCH

## (undated) DEVICE — NDL SPNE QNCKE 18GX3.5IN LF --

## (undated) DEVICE — HANDPIECE SET WITH COAXIAL HIGH FLOW TIP AND SUCTION TUBE: Brand: INTERPULSE

## (undated) DEVICE — SOL INJ SOD CL 0.9% 1000ML BG --

## (undated) DEVICE — Z DISCONTINUED LINER BOOT TRACTION NS LINDY LF

## (undated) DEVICE — COVER,TABLE,60X90,STERILE: Brand: MEDLINE

## (undated) DEVICE — MARKER,SKIN,WI/RULER AND LABELS: Brand: MEDLINE

## (undated) DEVICE — DERMABOND SKIN ADH 0.7ML -- DERMABOND ADVANCED 12/BX